# Patient Record
Sex: FEMALE | Race: WHITE | Employment: UNEMPLOYED | URBAN - METROPOLITAN AREA
[De-identification: names, ages, dates, MRNs, and addresses within clinical notes are randomized per-mention and may not be internally consistent; named-entity substitution may affect disease eponyms.]

---

## 2017-02-20 ENCOUNTER — NURSING HOME VISIT (OUTPATIENT)
Dept: GERIATRICS | Facility: CLINIC | Age: 82
End: 2017-02-20
Payer: MEDICARE

## 2017-02-20 VITALS
RESPIRATION RATE: 18 BRPM | BODY MASS INDEX: 32.76 KG/M2 | DIASTOLIC BLOOD PRESSURE: 67 MMHG | WEIGHT: 166.9 LBS | OXYGEN SATURATION: 97 % | SYSTOLIC BLOOD PRESSURE: 111 MMHG | HEART RATE: 84 BPM | HEIGHT: 60 IN | TEMPERATURE: 98.3 F

## 2017-02-20 DIAGNOSIS — M15.0 PRIMARY OSTEOARTHRITIS INVOLVING MULTIPLE JOINTS: ICD-10-CM

## 2017-02-20 DIAGNOSIS — E03.4 HYPOTHYROIDISM DUE TO ACQUIRED ATROPHY OF THYROID: ICD-10-CM

## 2017-02-20 DIAGNOSIS — F02.80 LATE ONSET ALZHEIMER'S DISEASE WITHOUT BEHAVIORAL DISTURBANCE (H): Primary | ICD-10-CM

## 2017-02-20 DIAGNOSIS — G30.1 LATE ONSET ALZHEIMER'S DISEASE WITHOUT BEHAVIORAL DISTURBANCE (H): Primary | ICD-10-CM

## 2017-02-20 PROCEDURE — 99207 ZZC CDG-CORRECTLY CODED, REVIEWED AND AGREE: CPT | Performed by: FAMILY MEDICINE

## 2017-02-20 PROCEDURE — 99308 SBSQ NF CARE LOW MDM 20: CPT | Performed by: FAMILY MEDICINE

## 2017-02-20 NOTE — PROGRESS NOTES
[facility]    Chief Complaint   Patient presents with     jail Regulatory       HPI:    Alondra Thompson is a 89 year old  (2/27/1919), who is being seen today for a federally mandated E/M visit at [facility]. Today's concerns are:  1. Late onset Alzheimer's disease without behavioral disturbance - stable at present, redirectable, no issues today   2. Primary osteoarthritis involving multiple joints - mild complaint pain, knees mostly- ok with current tx   3. Hypothyroidism due to acquired atrophy of thyroid - on synthroid - monitor with TSH q 6 months       ALLERGIES: Macrodantin [nitrofurantoin]  PAST MEDICAL HISTORY:  has a past medical history of Arthritis; Dementia; and Hypothyroidism.  PAST SURGICAL HISTORY:  has a past surgical history that includes orthopedic surgery; hernia repair; and Abdomen surgery.  FAMILY HISTORY: family history is not on file.  SOCIAL HISTORY:  reports that she has never smoked. She does not have any smokeless tobacco history on file. She reports that she drinks alcohol.    MEDICATIONS:  Current Outpatient Prescriptions   Medication Sig Dispense Refill     levothyroxine (SYNTHROID) 112 MCG tablet Take 1 tablet (112 mcg) by mouth daily 90 tablet 11     ammonium lactate (AMLACTIN) 12 % cream Apply 1 g topically 2 times daily 385 g 11     nystatin (MYCOSTATIN) 802541 UNIT/GM POWD Apply 1 g topically 2 times daily as needed 60 g 11     acetaminophen (TYLENOL) 500 MG tablet Take 2 tablets (1,000 mg) by mouth 2 times daily 90 tablet 11     polyethylene glycol (MIRALAX/GLYCOLAX) Packet Take 17 g by mouth 2 times daily as needed for constipation 24 packet 11     multivitamin, therapeutic with minerals (THERA-VIT-M) TABS tablet Take 1 tablet by mouth daily 30 each 11     Medications reviewed:  Medications reconciled to facility chart and changes were made to reflect current medications as identified as above med list. Below are the  changes that were made:   Medications stopped since last EPIC medication reconciliation:   There are no discontinued medications.    Medications started since last [facility] medication reconciliation:  No orders of the defined types were placed in this encounter.      Case Management:  I have reviewed the care plan and MDS and do agree with the plan. Patient's desire to return to the community is not present.  Information reviewed:  Medications, vital signs, orders, and nursing notes.    ROS:  10 point ROS of systems including Constitutional, Eyes, Respiratory, Cardiovascular, Gastroenterology, Genitourinary, Integumentary, Muscularskeletal, Psychiatric were all negative except for pertinent positives noted in my HPI.    Exam:  /67  Pulse 84  Temp 98.3  F (36.8  C)  Resp 18  Ht 5' (1.524 m)  Wt 166 lb 14.4 oz (75.7 kg)  SpO2 97%  BMI 32.6 kg/m2  GENERAL APPEARANCE:  Alert, in no distress, thin, cooperative  RESP:  respiratory effort and palpation of chest normal, lungs clear to auscultation , no respiratory distress  CV:  Palpation and auscultation of heart done , regular rate and rhythm, no murmur, rub, or gallop, no edema  PSYCH:  insight and judgement impaired, memory impaired     Lab/Diagnostic data:    No Current Labs on file.    ASSESSMENT/PLAN  1. Late onset Alzheimer's disease without behavioral disturbance - stable at present, redirectable, no issues today- cont 24/7 NH care   2. Primary osteoarthritis involving multiple joints - mild complaint pain, knees mostly- ok with current tx   3. Hypothyroidism due to acquired atrophy of thyroid - on synthroid - monitor with TSH q 6 months           Total time spent with patient visit was 15 min including patient visit and review of past records.Greater than 50% of total time spent with counseling and coordinating care.    Electronically signed by:  Jorge Luis Moser MD

## 2017-04-03 ENCOUNTER — NURSING HOME VISIT (OUTPATIENT)
Dept: GERIATRICS | Facility: CLINIC | Age: 82
End: 2017-04-03
Payer: MEDICARE

## 2017-04-03 VITALS
HEART RATE: 77 BPM | OXYGEN SATURATION: 97 % | DIASTOLIC BLOOD PRESSURE: 77 MMHG | SYSTOLIC BLOOD PRESSURE: 134 MMHG | WEIGHT: 165.2 LBS | RESPIRATION RATE: 18 BRPM | HEIGHT: 60 IN | BODY MASS INDEX: 32.43 KG/M2 | TEMPERATURE: 97.7 F

## 2017-04-03 DIAGNOSIS — F02.80 LATE ONSET ALZHEIMER'S DISEASE WITHOUT BEHAVIORAL DISTURBANCE (H): Primary | ICD-10-CM

## 2017-04-03 DIAGNOSIS — K59.00 CONSTIPATION, UNSPECIFIED CONSTIPATION TYPE: ICD-10-CM

## 2017-04-03 DIAGNOSIS — I10 BENIGN ESSENTIAL HYPERTENSION: ICD-10-CM

## 2017-04-03 DIAGNOSIS — B37.2 CANDIDIASIS OF SKIN: ICD-10-CM

## 2017-04-03 DIAGNOSIS — M15.0 PRIMARY OSTEOARTHRITIS INVOLVING MULTIPLE JOINTS: ICD-10-CM

## 2017-04-03 DIAGNOSIS — E03.9 HYPOTHYROIDISM, UNSPECIFIED TYPE: ICD-10-CM

## 2017-04-03 DIAGNOSIS — G30.1 LATE ONSET ALZHEIMER'S DISEASE WITHOUT BEHAVIORAL DISTURBANCE (H): Primary | ICD-10-CM

## 2017-04-03 PROCEDURE — 99318 ZZC ANNUAL NURSING FAC ASSESSMNT, STABLE: CPT | Performed by: NURSE PRACTITIONER

## 2017-04-03 PROCEDURE — 99207 ZZC CDG-CORRECTLY CODED, REVIEWED AND AGREE: CPT | Performed by: NURSE PRACTITIONER

## 2017-04-03 NOTE — PROGRESS NOTES
[facility]  Chief Complaint   Patient presents with     Annual Comprehensive Nursing Home       HPI:    Alondra Thompson is a 89 year old  (2/27/1919), who is being seen today for an annual comprehensive visit at [facility]. Today's concerns are:  Late onset Alzheimer's disease without behavioral disturbance  Oriented to self. Is very pleasant. Staff reports occasional behaviors, more so with cares, but she is usually cooperative and redirectable. She is very conversational, but is very repetitive. She has been eating and sleeping well. Weights slightly down, but fairly stable.    Primary osteoarthritis involving multiple joints  Has history of arthritis, with chronic knee pain. She is on scheduled tylenol, with good pain control. She denies pain on exam today. She is primarily WC bound. No recent falls noted.    Hypothyroidism, unspecified type  Chronic, currently on synthroid daily. Does was adjusted in 10/2016. Recheck on TSH on 11/17/17 was normal at 3.71    Candidiasis of skin  Has history of recurrent candidiasis on skin under breasts. None noted on exam today. She does have PRN nystatin available, which has been effective.     Constipation, unspecified constipation type  No issues noted recently She is on PRN miralax and has been having regular movements. Patient denies any issues with constipation currently.    Benign essential hypertension  Historical. Not currently on any medications. Denies any headaches, lightheadedness, chest pain, or SOB. Goal of care is comfort.   Last 3 BPs:134/77, 153/94, 132/72.  Current Weight: 165.2 lbs      3/29/19 PHQ9: 1/30    ALLERGIES: Macrodantin [nitrofurantoin]  PROBLEM LIST:  Patient Active Problem List   Diagnosis     Knee pain     Dementia     Osteoarthritis     Risk for falls     Hypothyroidism     Advance Care Planning     Annual physical exam 4/9/15     Late onset Alzheimer's disease without behavioral disturbance      PAST MEDICAL HISTORY:  has a past medical history of Arthritis; Dementia; and Hypothyroidism.  PAST SURGICAL HISTORY:  has a past surgical history that includes orthopedic surgery; hernia repair; and Abdomen surgery.  FAMILY HISTORY: family history is not on file.  SOCIAL HISTORY:  reports that she has never smoked. She does not have any smokeless tobacco history on file. She reports that she drinks alcohol.  IMMUNIZATIONS:  Most Recent Immunizations   Administered Date(s) Administered     Influenza (High Dose) 3 valent vaccine 10/17/2016     Influenza (IIV3) 10/08/2015     Pneumococcal (PCV 13) 10/14/2015     Pneumococcal 23 valent 04/08/2014     TD (ADULT, 7+) 03/29/2011     Above immunizations pulled from [facility]. [facility] and facility records also reconciled. Outstanding information sent to  to update [facility]. Future immunizations are not needed at this point as all recommended immunizations are up to date.   MEDICATIONS:  Current Outpatient Prescriptions   Medication Sig Dispense Refill     levothyroxine (SYNTHROID) 112 MCG tablet Take 1 tablet (112 mcg) by mouth daily 90 tablet 11     ammonium lactate (AMLACTIN) 12 % cream Apply 1 g topically 2 times daily 385 g 11     nystatin (MYCOSTATIN) 030346 UNIT/GM POWD Apply 1 g topically 2 times daily as needed 60 g 11     acetaminophen (TYLENOL) 500 MG tablet Take 2 tablets (1,000 mg) by mouth 2 times daily 90 tablet 11     polyethylene glycol (MIRALAX/GLYCOLAX) Packet Take 17 g by mouth 2 times daily as needed for constipation 24 packet 11     multivitamin, therapeutic with minerals (THERA-VIT-M) TABS tablet Take 1 tablet by mouth daily 30 each 11     Medications reviewed:  Medications reconciled to facility chart and changes were made to reflect current medications as identified as above med list. Below are the changes that were made:   Medications stopped since last EPIC medication reconciliation:   There are no discontinued  medications.    Medications started since last [facility] medication reconciliation:  No orders of the defined types were placed in this encounter.        Case Management:  I have reviewed the facility/SNF care plan/MDS which was done 3/27/17, including the falls risk, nutrition and pain screening. I also reviewed the current immunizations, and preventive care..Future cancer screening is not clinically indicated secondary to age/goals of care Patient's desire to return to the community is not assessible due to cognitive impairment. Current Level of Care is appropriate.    Advance Directive Discussion:    I reviewed the current advanced directives as reflected in [facility] and the facility chart. I contacted the first party [employer] and left a message regarding the plan of Care. I reviewed the POLST, resigned, dated and sent to [employer].  I did not due to cognitive impairment review the advance directives with the resident.     Team Discussion:  I communicated with the appropriate disciplines involved with the Plan of Care:   Nursing    Patient Goal:  Patient's goal is unobtainable secondary to cognitive impairment.    Information reviewed:  Medications, vital signs, orders, and nursing notes.    ROS:  10 point ROS of systems including Constitutional, Eyes, Respiratory, Cardiovascular, Gastroenterology, Genitourinary, Integumentary, Muscularskeletal, Psychiatric were all negative except for pertinent positives noted in my HPI.    Exam:  /77  Pulse 77  Temp 97.7  F (36.5  C)  Resp 18  Ht 5' (1.524 m)  Wt 165 lb 3.2 oz (74.9 kg)  SpO2 97%  BMI 32.26 kg/m2    GENERAL APPEARANCE:  Alert, in no distress, cooperative  ENT:  Mouth and posterior oropharynx normal, moist mucous membranes, [place], poor dentition, missing upper dentures  EYES:  EOM, conjunctivae, lids, pupils and irises normal, PERRL  NECK:  No adenopathy,masses or thyromegaly  RESP:  respiratory effort and palpation of chest normal, lungs clear to  auscultation , no respiratory distress  CV:  Palpation and auscultation of heart done , regular rate and rhythm, no murmur, rub, or gallop, +2 pedal pulses, peripheral edema trace+ in bilat LE  ABDOMEN:  normal bowel sounds, soft, nontender, no hepatosplenomegaly or other masses, no guarding or rebound  M/S:   Gait and station abnormal primarily WC bound, no joint tenderness, full ROM BUE, LE strength 4/5, BUE 4/5  SKIN:  Inspection of skin and subcutaneous tissue baseline, Palpation of skin and subcutaneous tissue baseline, no rashes, bruises, or wounds noted  NEURO:   Cranial nerves 2-12 are normal tested and grossly at patient's baseline, Examination of sensation by touch normal  PSYCH:  oriented to self, insight and judgement impaired, memory impaired , affect and mood normal, very pleasant, smiling, laughing and joking with provider     Lab/Diagnostic data:      No recent labs on file      ASSESSMENT/PLAN  (G30.1,  F02.80) Late onset Alzheimer's disease without behavioral disturbance  (primary encounter diagnosis)  Comment: Progressive. Expect further cognitive and functional declines. Expect weight loss  Plan: Appropriate for 24 hour nursing care, continue NH cares    (M15.0) Primary osteoarthritis involving multiple joints  Comment: Controlled  Plan: Continue scheduled tylenol    (E03.9) Hypothyroidism, unspecified type  Comment: Chronic, stable  Plan: Continue synthroid at current dose, monitor TSH q 6 months    (B37.2) Candidiasis of skin  Comment: Recurrent, not currently present  Plan: Continue to monitor, PRN nystatin    (K59.00) Constipation, unspecified constipation type  Comment: Controlled  Plan: Continue PRN miralax    (I10) Benign essential hypertension  Comment: Historical, controlled. To avoid risk of hypotension, falls, dizziness and tissue hypoperfusion, recommend BP goal is < 150/80mmHg.   Plan: Monitor VS, BMP PRN.     Depression screen done: PHQ-9 Given screen score and clinical assessment  patient is stable without any signs of depression and no futher interventions warrented at this time.    Based on JNC-8 goals,  patients age of 89 year old, no presence of diabetes or CKD, and goals of care goal BP is <150/90 mm Hg. patient is stable and continue without pharmacological invention with routine assessment.      Electronically signed by:  JOSE Peterson CNP

## 2017-06-14 VITALS
RESPIRATION RATE: 18 BRPM | TEMPERATURE: 97.1 F | BODY MASS INDEX: 31.31 KG/M2 | DIASTOLIC BLOOD PRESSURE: 68 MMHG | SYSTOLIC BLOOD PRESSURE: 130 MMHG | HEART RATE: 94 BPM | HEIGHT: 60 IN | OXYGEN SATURATION: 97 % | WEIGHT: 159.5 LBS

## 2017-06-15 ENCOUNTER — NURSING HOME VISIT (OUTPATIENT)
Dept: GERIATRICS | Facility: CLINIC | Age: 82
End: 2017-06-15
Payer: MEDICARE

## 2017-06-15 DIAGNOSIS — G89.29 CHRONIC PAIN OF BOTH KNEES: ICD-10-CM

## 2017-06-15 DIAGNOSIS — M25.562 CHRONIC PAIN OF BOTH KNEES: ICD-10-CM

## 2017-06-15 DIAGNOSIS — M15.0 PRIMARY OSTEOARTHRITIS INVOLVING MULTIPLE JOINTS: Primary | ICD-10-CM

## 2017-06-15 DIAGNOSIS — M25.561 CHRONIC PAIN OF BOTH KNEES: ICD-10-CM

## 2017-06-15 DIAGNOSIS — G30.1 LATE ONSET ALZHEIMER'S DISEASE WITHOUT BEHAVIORAL DISTURBANCE (H): ICD-10-CM

## 2017-06-15 DIAGNOSIS — F02.80 LATE ONSET ALZHEIMER'S DISEASE WITHOUT BEHAVIORAL DISTURBANCE (H): ICD-10-CM

## 2017-06-15 DIAGNOSIS — E03.9 ACQUIRED HYPOTHYROIDISM: ICD-10-CM

## 2017-06-15 PROCEDURE — 99207 ZZC CDG-CORRECTLY CODED, REVIEWED AND AGREE: CPT | Performed by: INTERNAL MEDICINE

## 2017-06-15 PROCEDURE — 99309 SBSQ NF CARE MODERATE MDM 30: CPT | Performed by: INTERNAL MEDICINE

## 2017-06-15 NOTE — PROGRESS NOTES
"Alondra Thompson is a 89 year old female seen More 15, 2017 at [facility] where she has resided for 3 years (admit 3/2014) seen to follow up dementia, OA.   In LTC because of dementia, no longer able to manage in the community.     Patient is seen on the unit, up to WC.   Pleasant and laughing.   States \"If I was any better I'd have to be seen by someone\"   Repetitive, but content and affect are good.      Patient has OA / DJD with knee pain as primary symptoms   Only time she is irritable is when she transfers to BR.   No pain when sitting in her WC, which she uses for all destinations.     Has h/o hypothyroidism and is on replacement       Past Medical History:   Diagnosis Date     Arthritis      Dementia      Hypothyroidism      SH:    Single, previously lived with her sister, house in [place].   Retired from work for the [employer]        ROS:  Limited, but negative other than above.   Weight is stable       EXAM:  NAD  /68  Pulse 94  Temp 97.1  F (36.2  C)  Resp 18  Ht 5' (1.524 m)  Wt 159 lb 8 oz (72.3 kg)  SpO2 97%  BMI 31.15 kg/m2   Neck supple without adenopathy  Lung clear bilaterally with fair air movement  Heart RRR s1s2, 1/6 JULI  Abd obese, soft, NT, no distention, +BS  Ext 1-2+ LE edema to hips.     Deforming changes of OA in both hands.     Psych: affect okay  Neuro: no focal deficits, confused.     TSH   Date Value Ref Range Status   10/04/2016 7.31 (A) 5 mcU/mL Final   11/17/16    3.71    IMP/PLAN:  (M15.0) Primary osteoarthritis involving multiple joints    Comment: with deformities, some pain and decreased mobility     Plan: scheduled and prn acetaminophen, local measures.       (M25.561,  M25.562,  G89.29) Chronic pain of both knees  Comment: DJD     Plan: pain regimen as above, assist for transfers, and WC mobility.       (E03.9) Acquired hypothyroidism  Comment: on replacement  Plan: yearly TSH, goal 4-6       (G30.1,  F02.80) Late onset Alzheimer's " disease without behavioral disturbance  Comment: gradual decline, low functional status   Plan: LTC support for assist with meds, meals, activity, mobility and safety.        Adrianne Vila MD

## 2017-07-13 ENCOUNTER — TRANSFERRED RECORDS (OUTPATIENT)
Dept: HEALTH INFORMATION MANAGEMENT | Facility: CLINIC | Age: 82
End: 2017-07-13

## 2017-07-13 LAB — TSH SERPL-ACNC: 4.74 UIU/ML (ref 0.3–5)

## 2017-08-09 VITALS
HEART RATE: 86 BPM | TEMPERATURE: 98.4 F | SYSTOLIC BLOOD PRESSURE: 115 MMHG | BODY MASS INDEX: 31.47 KG/M2 | HEIGHT: 60 IN | OXYGEN SATURATION: 97 % | DIASTOLIC BLOOD PRESSURE: 63 MMHG | RESPIRATION RATE: 18 BRPM | WEIGHT: 160.3 LBS

## 2017-08-09 NOTE — PROGRESS NOTES
[facility]    Chief Complaint   Patient presents with     shelter Regulatory       HPI:    Alondra Thompson is a 89 year old  (2/27/1919), who is being seen today for a federally mandated E/M visit at [facility].  HPI information obtained from: facility chart records, facility staff and patient report. Today's concerns are:    Primary osteoarthritis involving multiple joints  Chronic, primarily to knees.  On scheduled APAP with good effect.  W/C bound, no recent falls     Acquired hypothyroidism  Chronic, on synthroid.   Lab Results   Component Value Date    TSH 4.74 07/13/2017         Late onset Alzheimer's disease without behavioral disturbance  Chronic and progressive.  Occasional behaviors with cares, generally redirectable.  Eating and sleeping well per staff, weight stable.  Wt Readings from Last 3 Encounters:   08/09/17 160 lb 4.8 oz (72.7 kg)   06/14/17 159 lb 8 oz (72.3 kg)   04/03/17 165 lb 3.2 oz (74.9 kg)       Constipation, unspecified constipation type  Per history, has prn miralex.  Per records having regular BM.  Denies/no reports of constipation or diarrhea, abdominal pain     Benign essential hypertension  Per history, goal of care comfort, not on antihypertensive.  Denies/no reports of chest pain, palpitations, headache.  Last 2 BPs:115/63, 119/61         Current Weight: 160.3 lbs      ALLERGIES: Macrodantin [nitrofurantoin]  PAST MEDICAL HISTORY:  has a past medical history of Arthritis; Dementia; and Hypothyroidism.  PAST SURGICAL HISTORY:  has a past surgical history that includes orthopedic surgery; hernia repair; and Abdomen surgery.  FAMILY HISTORY: family history is not on file.  SOCIAL HISTORY:  reports that she has never smoked. She does not have any smokeless tobacco history on file. She reports that she drinks alcohol.    MEDICATIONS:  Current Outpatient Prescriptions   Medication Sig Dispense Refill     levothyroxine  (SYNTHROID) 112 MCG tablet Take 1 tablet (112 mcg) by mouth daily 90 tablet 11     ammonium lactate (AMLACTIN) 12 % cream Apply 1 g topically 2 times daily 385 g 11     nystatin (MYCOSTATIN) 063310 UNIT/GM POWD Apply 1 g topically 2 times daily as needed 60 g 11     acetaminophen (TYLENOL) 500 MG tablet Take 2 tablets (1,000 mg) by mouth 2 times daily 90 tablet 11     polyethylene glycol (MIRALAX/GLYCOLAX) Packet Take 17 g by mouth 2 times daily as needed for constipation 24 packet 11     multivitamin, therapeutic with minerals (THERA-VIT-M) TABS tablet Take 1 tablet by mouth daily 30 each 11     Medications reviewed:  Medications reconciled to facility chart and changes were made to reflect current medications as identified as above med list. Below are the changes that were made:   Medications stopped since last EPIC medication reconciliation:   There are no discontinued medications.    Medications started since last [facility] medication reconciliation:  No orders of the defined types were placed in this encounter.        Case Management:  I have reviewed the care plan and MDS and do agree with the plan. Patient's desire to return to the community is not present.  Information reviewed:  Medications, vital signs, orders, and nursing notes.    ROS:  Unobtainable secondary to cognitive impairment or aphasia, but today pt reports doing well     Exam:  Vitals: /63  Pulse 86  Temp 98.4  F (36.9  C)  Resp 18  Ht 5' (1.524 m)  Wt 160 lb 4.8 oz (72.7 kg)  SpO2 97%  BMI 31.31 kg/m2  BMI= Body mass index is 31.31 kg/(m^2).  GENERAL APPEARANCE:  Alert, in no distress, sitting up in w/c in sunroom  ENT:  Mouth and posterior oropharynx normal, moist mucous membranes, poor dentition  RESP:  respiratory effort and palpation of chest normal, lungs clear to auscultation , no respiratory distress  CV:  Palpation and auscultation of heart done , regular rate and rhythm, no murmur, rub, or gallop, peripheral edema 2+ in BLLE,  extremities cool to touch---baseline  ABDOMEN:  normal bowel sounds, soft, nontender, no hepatosplenomegaly or other masses  M/S:   Gait and station abnormal primarily w/c bound, CMS intact, no erythema/edema of joints   NEURO:   Cranial nerves 2-12 are normal tested and grossly at patient's baseline, speech clear, no tremor   PSYCH:  oriented to person, place, insight and judgement impaired, memory impaired        Lab/Diagnostic data:      TSH   Date Value Ref Range Status   07/13/2017 4.74 0.30 - 5.00 uIU/mL Final   10/04/2016 7.31 (A) 5 mcU/mL Final       ASSESSMENT/PLAN  (M15.0) Primary osteoarthritis involving multiple joints  (primary encounter diagnosis)  Comment: Chronic, stable  Plan: continue APAP    (E03.9) Acquired hypothyroidism  Comment: Chronic, stable  Plan: Continue Synthroid, monitor TSH Q 6 months     (G30.1,  F02.80) Late onset Alzheimer's disease without behavioral disturbance  Comment: Chronic and progressive  Plan: Continue with care and support in NH     (K59.00) Constipation, unspecified constipation type  Comment: Chronic, stable  Plan: prn miralex    (I10) Benign essential hypertension  Comment: Per history, not currently on meds  Plan: Monitor BP--goal of care comfort, goal SBP < 150            Electronically signed by:  JOSE Ennis CNP

## 2017-08-10 ENCOUNTER — NURSING HOME VISIT (OUTPATIENT)
Dept: GERIATRICS | Facility: CLINIC | Age: 82
End: 2017-08-10
Payer: MEDICARE

## 2017-08-10 DIAGNOSIS — I10 BENIGN ESSENTIAL HYPERTENSION: ICD-10-CM

## 2017-08-10 DIAGNOSIS — G30.1 LATE ONSET ALZHEIMER'S DISEASE WITHOUT BEHAVIORAL DISTURBANCE (H): ICD-10-CM

## 2017-08-10 DIAGNOSIS — E03.9 ACQUIRED HYPOTHYROIDISM: ICD-10-CM

## 2017-08-10 DIAGNOSIS — K59.00 CONSTIPATION, UNSPECIFIED CONSTIPATION TYPE: ICD-10-CM

## 2017-08-10 DIAGNOSIS — F02.80 LATE ONSET ALZHEIMER'S DISEASE WITHOUT BEHAVIORAL DISTURBANCE (H): ICD-10-CM

## 2017-08-10 DIAGNOSIS — M15.0 PRIMARY OSTEOARTHRITIS INVOLVING MULTIPLE JOINTS: Primary | ICD-10-CM

## 2017-08-10 PROCEDURE — 99309 SBSQ NF CARE MODERATE MDM 30: CPT | Performed by: NURSE PRACTITIONER

## 2017-10-11 VITALS
SYSTOLIC BLOOD PRESSURE: 104 MMHG | BODY MASS INDEX: 31.47 KG/M2 | WEIGHT: 160.3 LBS | HEART RATE: 86 BPM | DIASTOLIC BLOOD PRESSURE: 57 MMHG | HEIGHT: 60 IN | RESPIRATION RATE: 20 BRPM | TEMPERATURE: 97.4 F | OXYGEN SATURATION: 97 %

## 2017-10-12 ENCOUNTER — NURSING HOME VISIT (OUTPATIENT)
Dept: GERIATRICS | Facility: CLINIC | Age: 82
End: 2017-10-12
Payer: MEDICARE

## 2017-10-12 DIAGNOSIS — G30.1 LATE ONSET ALZHEIMER'S DISEASE WITHOUT BEHAVIORAL DISTURBANCE (H): ICD-10-CM

## 2017-10-12 DIAGNOSIS — M25.562 CHRONIC PAIN OF BOTH KNEES: ICD-10-CM

## 2017-10-12 DIAGNOSIS — M15.0 PRIMARY OSTEOARTHRITIS INVOLVING MULTIPLE JOINTS: Primary | ICD-10-CM

## 2017-10-12 DIAGNOSIS — F02.80 LATE ONSET ALZHEIMER'S DISEASE WITHOUT BEHAVIORAL DISTURBANCE (H): ICD-10-CM

## 2017-10-12 DIAGNOSIS — M25.561 CHRONIC PAIN OF BOTH KNEES: ICD-10-CM

## 2017-10-12 DIAGNOSIS — G89.29 CHRONIC PAIN OF BOTH KNEES: ICD-10-CM

## 2017-10-12 PROCEDURE — 99308 SBSQ NF CARE LOW MDM 20: CPT | Performed by: INTERNAL MEDICINE

## 2017-10-12 PROCEDURE — 99207 ZZC CDG-CORRECTLY CODED, REVIEWED AND AGREE: CPT | Performed by: INTERNAL MEDICINE

## 2017-12-14 ENCOUNTER — NURSING HOME VISIT (OUTPATIENT)
Dept: GERIATRICS | Facility: CLINIC | Age: 82
End: 2017-12-14
Payer: MEDICARE

## 2017-12-14 VITALS
HEIGHT: 60 IN | WEIGHT: 160.3 LBS | RESPIRATION RATE: 13 BRPM | TEMPERATURE: 97.8 F | BODY MASS INDEX: 31.47 KG/M2 | SYSTOLIC BLOOD PRESSURE: 132 MMHG | DIASTOLIC BLOOD PRESSURE: 69 MMHG | OXYGEN SATURATION: 97 % | HEART RATE: 65 BPM

## 2017-12-14 DIAGNOSIS — G30.9 ALZHEIMER'S DEMENTIA WITH BEHAVIORAL DISTURBANCE, UNSPECIFIED TIMING OF DEMENTIA ONSET: ICD-10-CM

## 2017-12-14 DIAGNOSIS — F02.81 ALZHEIMER'S DEMENTIA WITH BEHAVIORAL DISTURBANCE, UNSPECIFIED TIMING OF DEMENTIA ONSET: ICD-10-CM

## 2017-12-14 DIAGNOSIS — I10 BENIGN ESSENTIAL HYPERTENSION: ICD-10-CM

## 2017-12-14 DIAGNOSIS — K59.00 CONSTIPATION, UNSPECIFIED CONSTIPATION TYPE: ICD-10-CM

## 2017-12-14 DIAGNOSIS — M15.0 PRIMARY OSTEOARTHRITIS INVOLVING MULTIPLE JOINTS: Primary | ICD-10-CM

## 2017-12-14 DIAGNOSIS — E03.9 ACQUIRED HYPOTHYROIDISM: ICD-10-CM

## 2017-12-14 PROCEDURE — 99309 SBSQ NF CARE MODERATE MDM 30: CPT | Performed by: NURSE PRACTITIONER

## 2017-12-14 NOTE — PROGRESS NOTES
[place] [facility]    Chief Complaint   Patient presents with     [facility] Regulatory       HPI:    Alondra Thompson is a 89 year old  (2/27/1919), who is being seen today for a federally mandated E/M visit at [facility].  HPI information obtained from: facility chart records, facility staff, patient report and [facility] chart review. Today's concerns are:    Primary osteoarthritis involving multiple joints  Per history to multiple joints.  Denies/no reports of pain.  No longer ambulatory.  Last fall noted in October with no injuries    Late onset Alzheimer's disease without behavioral disturbance  Chronic and progressive.  Behaviors with cares, per NH notes can refuse to take bathes, does not like assistance in bathroom.  Her behaviors with staff often include yelling and cursing and occasional combativeness.  Eating and sleeping well per staff, weight stable.  Wt Readings from Last 3 Encounters:   12/14/17 160 lb 4.8 oz (72.7 kg)   10/11/17 160 lb 4.8 oz (72.7 kg)   08/09/17 160 lb 4.8 oz (72.7 kg)       Acquired hypothyroidism  Chronic, on synthroid.   Lab Results   Component Value Date    TSH 4.74 07/13/2017       Constipation, unspecified constipation type  Per history, has prn miralex.  Per records having regular BM.  Denies/no reports of constipation or diarrhea, abdominal pain     Benign essential hypertension  Per history, goal of care comfort, not on antihypertensive.  Denies/no reports of chest pain, palpitations, headache.    Last 3 BPs: 132/69, 149/76, 110/62  Lab Results   Component Value Date    POTASSIUM 4.2 04/07/2016     Lab Results   Component Value Date    CR 0.73 04/07/2016            Current Weight: 160.3 lbs    ALLERGIES: Macrodantin [nitrofurantoin]  PAST MEDICAL HISTORY:  has a past medical history of Arthritis; Dementia; and Hypothyroidism.  PAST SURGICAL HISTORY:  has a past surgical history that includes orthopedic surgery; hernia  "repair; and Abdomen surgery.  FAMILY HISTORY: family history is not on file.  SOCIAL HISTORY:  reports that she has never smoked. She does not have any smokeless tobacco history on file. She reports that she drinks alcohol.    MEDICATIONS:  Current Outpatient Prescriptions   Medication Sig Dispense Refill     levothyroxine (SYNTHROID) 112 MCG tablet Take 1 tablet (112 mcg) by mouth daily 90 tablet 11     ammonium lactate (AMLACTIN) 12 % cream Apply 1 g topically 2 times daily 385 g 11     nystatin (MYCOSTATIN) 002802 UNIT/GM POWD Apply 1 g topically 2 times daily as needed 60 g 11     acetaminophen (TYLENOL) 500 MG tablet Take 2 tablets (1,000 mg) by mouth 2 times daily 90 tablet 11     polyethylene glycol (MIRALAX/GLYCOLAX) Packet Take 17 g by mouth 2 times daily as needed for constipation 24 packet 11     multivitamin, therapeutic with minerals (THERA-VIT-M) TABS tablet Take 1 tablet by mouth daily 30 each 11     Medications reviewed:  Medications reconciled to facility chart and changes were made to reflect current medications as identified as above med list. Below are the changes that were made:   Medications stopped since last EPIC medication reconciliation:   There are no discontinued medications.    Medications started since last [facility] medication reconciliation:  No orders of the defined types were placed in this encounter.      Case Management:  I have reviewed the care plan and MDS and do agree with the plan. Patient's desire to return to the community is not assessible due to cognitive impairment.  Information reviewed:  Medications, vital signs, orders, and nursing notes.    ROS:  Unobtainable secondary to cognitive impairment or aphasia, but today pt reports \"pushing the heck out of 100\"    Exam:  Vitals: /69  Pulse 65  Temp 97.8  F (36.6  C)  Resp 13  Ht 5' (1.524 m)  Wt 160 lb 4.8 oz (72.7 kg)  SpO2 97%  BMI 31.31 kg/m2  BMI= Body mass index is 31.31 kg/(m^2).  GENERAL APPEARANCE:  Alert, " in no distress, sitting up in w/c in sunroom  ENT:  Mouth and posterior oropharynx normal, moist mucous membranes, poor dentition  RESP:  respiratory effort and palpation of chest normal, lungs clear to auscultation , no respiratory distress  CV:  Palpation and auscultation of heart done , regular rate and rhythm,  murmur, no rub, or gallop, peripheral edema 2+ in BLLE  ABDOMEN:  normal bowel sounds, soft, nontender, no hepatosplenomegaly or other masses  M/S:   Gait and station abnormal primarily w/c bound, CMS intact, no erythema/edema of joints, deforming changes of OA in both hands  NEURO:   Cranial nerves 2-12 are normal tested and grossly at patient's baseline, speech clear, no tremor   PSYCH:  oriented to person, place, insight and judgement impaired, memory impaired, pleasant during encounter     Lab/Diagnostic data:      TSH   Date Value Ref Range Status   07/13/2017 4.74 0.30 - 5.00 uIU/mL Final   10/04/2016 7.31 (A) 5 mcU/mL Final       ASSESSMENT/PLAN  (M15.0) Primary osteoarthritis involving multiple joints  (primary encounter diagnosis)  Comment: Chronic, deformities hands, decreased mobilities  Plan: change APAP to prn--staff update if having pain   -assist transfers and cares    (G30.1,  F02.80) Late onset Alzheimer's disease with behavioral disturbance  Comment: Chronic and progressive with behaviors during cares  Plan: appropriate for LTC, staff assist with cares, redirect behaviors, staff update if continue with difficulty with cares, may need to premedicate if continues to refuses bathes/sit in soiled clothing  -d'c MV, eating well, no weight loss    (E03.9) Acquired hypothyroidism  Comment: Chronic, stable  Plan: continue synthroid  -annual TSH    (K59.00) Constipation, unspecified constipation type  Comment: Chronic, no issues reported  Plan: prn bowel meds    (I10) Benign essential hypertension  Comment: chronic, WNL without meds at this time  Plan: monitor BP  -BMP next week            Electronically signed by:  JOSE Ennis CNP

## 2017-12-18 ENCOUNTER — TRANSFERRED RECORDS (OUTPATIENT)
Dept: HEALTH INFORMATION MANAGEMENT | Facility: CLINIC | Age: 82
End: 2017-12-18

## 2017-12-18 LAB
ANION GAP SERPL CALCULATED.3IONS-SCNC: 6 MMOL/L (ref 5–18)
BUN SERPL-MCNC: 16 MG/DL (ref 8–28)
CALCIUM SERPL-MCNC: 9 MG/DL (ref 8.5–10.5)
CHLORIDE SERPLBLD-SCNC: 110 MMOL/L (ref 98–107)
CO2 SERPL-SCNC: 26 MMOL/L (ref 22–31)
CREAT SERPL-MCNC: 0.73 MG/DL (ref 0.6–1.1)
GFR SERPL CREATININE-BSD FRML MDRD: >60 ML/MIN/1.73M2
GLUCOSE SERPL-MCNC: 74 MG/DL (ref 70–125)
POTASSIUM SERPL-SCNC: 4.4 MMOL/L (ref 3.5–5)
SODIUM SERPL-SCNC: 142 MMOL/L (ref 136–145)

## 2017-12-19 NOTE — PROGRESS NOTES
Order(s) created erroneously. Erroneous order ID: [id number]   Order canceled by: CHASTITY WAGNER   Order cancel date/time: 12/19/2017 10:27 AM

## 2018-01-01 ENCOUNTER — NURSING HOME VISIT (OUTPATIENT)
Dept: GERIATRICS | Facility: CLINIC | Age: 83
End: 2018-01-01
Payer: MEDICARE

## 2018-01-01 ENCOUNTER — CLINICAL UPDATE (OUTPATIENT)
Dept: PHARMACY | Facility: CLINIC | Age: 83
End: 2018-01-01

## 2018-01-01 ENCOUNTER — RECORDS - HEALTHEAST (OUTPATIENT)
Dept: LAB | Facility: CLINIC | Age: 83
End: 2018-01-01

## 2018-01-01 ENCOUNTER — TRANSFERRED RECORDS (OUTPATIENT)
Dept: HEALTH INFORMATION MANAGEMENT | Facility: CLINIC | Age: 83
End: 2018-01-01

## 2018-01-01 VITALS
OXYGEN SATURATION: 94 % | RESPIRATION RATE: 16 BRPM | SYSTOLIC BLOOD PRESSURE: 113 MMHG | HEIGHT: 60 IN | TEMPERATURE: 97.8 F | BODY MASS INDEX: 31.53 KG/M2 | DIASTOLIC BLOOD PRESSURE: 67 MMHG | HEART RATE: 78 BPM | WEIGHT: 160.6 LBS

## 2018-01-01 VITALS
SYSTOLIC BLOOD PRESSURE: 126 MMHG | BODY MASS INDEX: 32.31 KG/M2 | HEART RATE: 72 BPM | OXYGEN SATURATION: 95 % | WEIGHT: 164.6 LBS | DIASTOLIC BLOOD PRESSURE: 64 MMHG | TEMPERATURE: 97.4 F | HEIGHT: 60 IN | RESPIRATION RATE: 18 BRPM

## 2018-01-01 VITALS
SYSTOLIC BLOOD PRESSURE: 135 MMHG | DIASTOLIC BLOOD PRESSURE: 66 MMHG | HEART RATE: 78 BPM | TEMPERATURE: 96.9 F | RESPIRATION RATE: 16 BRPM | BODY MASS INDEX: 33.81 KG/M2 | HEIGHT: 60 IN | WEIGHT: 172.2 LBS | OXYGEN SATURATION: 95 %

## 2018-01-01 VITALS
DIASTOLIC BLOOD PRESSURE: 76 MMHG | RESPIRATION RATE: 18 BRPM | BODY MASS INDEX: 38.09 KG/M2 | WEIGHT: 164.6 LBS | HEART RATE: 65 BPM | TEMPERATURE: 98.1 F | SYSTOLIC BLOOD PRESSURE: 125 MMHG | HEIGHT: 55 IN | OXYGEN SATURATION: 95 %

## 2018-01-01 DIAGNOSIS — G30.1 LATE ONSET ALZHEIMER'S DISEASE WITH BEHAVIORAL DISTURBANCE (H): ICD-10-CM

## 2018-01-01 DIAGNOSIS — M15.0 PRIMARY OSTEOARTHRITIS INVOLVING MULTIPLE JOINTS: Primary | ICD-10-CM

## 2018-01-01 DIAGNOSIS — F02.818 LATE ONSET ALZHEIMER'S DISEASE WITH BEHAVIORAL DISTURBANCE (H): Primary | ICD-10-CM

## 2018-01-01 DIAGNOSIS — G30.1 LATE ONSET ALZHEIMER'S DISEASE WITH BEHAVIORAL DISTURBANCE (H): Primary | ICD-10-CM

## 2018-01-01 DIAGNOSIS — R60.0 LOCALIZED EDEMA: ICD-10-CM

## 2018-01-01 DIAGNOSIS — M15.0 PRIMARY OSTEOARTHRITIS INVOLVING MULTIPLE JOINTS: ICD-10-CM

## 2018-01-01 DIAGNOSIS — E03.9 ACQUIRED HYPOTHYROIDISM: ICD-10-CM

## 2018-01-01 DIAGNOSIS — K59.00 CONSTIPATION, UNSPECIFIED CONSTIPATION TYPE: ICD-10-CM

## 2018-01-01 DIAGNOSIS — I10 BENIGN ESSENTIAL HYPERTENSION: ICD-10-CM

## 2018-01-01 DIAGNOSIS — E03.9 HYPOTHYROIDISM, UNSPECIFIED TYPE: ICD-10-CM

## 2018-01-01 DIAGNOSIS — F02.818 LATE ONSET ALZHEIMER'S DISEASE WITH BEHAVIORAL DISTURBANCE (H): ICD-10-CM

## 2018-01-01 LAB
ANION GAP SERPL CALCULATED.3IONS-SCNC: 8 MMOL/L (ref 5–18)
ANION GAP SERPL CALCULATED.3IONS-SCNC: 8 MMOL/L (ref 5–18)
BUN SERPL-MCNC: 15 MG/DL (ref 8–28)
BUN SERPL-MCNC: 15 MG/DL (ref 8–28)
CALCIUM SERPL-MCNC: 8.8 MG/DL (ref 8.5–10.5)
CALCIUM SERPL-MCNC: 8.8 MG/DL (ref 8.5–10.5)
CHLORIDE BLD-SCNC: 109 MMOL/L (ref 98–107)
CHLORIDE SERPLBLD-SCNC: 109 MMOL/L (ref 98–107)
CO2 SERPL-SCNC: 24 MMOL/L (ref 22–31)
CO2 SERPL-SCNC: 24 MMOL/L (ref 22–31)
CREAT SERPL-MCNC: 0.69 MG/DL (ref 0.6–1.1)
CREAT SERPL-MCNC: 0.69 MG/DL (ref 0.6–1.1)
GFR SERPL CREATININE-BSD FRML MDRD: >60 ML/MIN/1.73M2
GFR SERPL CREATININE-BSD FRML MDRD: >60 ML/MIN/1.73M2
GLUCOSE BLD-MCNC: 80 MG/DL (ref 70–125)
GLUCOSE SERPL-MCNC: 80 MG/DL (ref 70–125)
POTASSIUM BLD-SCNC: 4.1 MMOL/L (ref 3.5–5)
POTASSIUM SERPL-SCNC: 4.1 MMOL/L (ref 3.5–5)
SODIUM SERPL-SCNC: 141 MMOL/L (ref 136–145)
SODIUM SERPL-SCNC: 141 MMOL/L (ref 136–145)
TSH SERPL DL<=0.005 MIU/L-ACNC: 2.87 UIU/ML (ref 0.3–5)
TSH SERPL-ACNC: 2.87 UIU/ML (ref 0.3–5)

## 2018-01-01 PROCEDURE — 99309 SBSQ NF CARE MODERATE MDM 30: CPT | Performed by: NURSE PRACTITIONER

## 2018-01-01 PROCEDURE — 99308 SBSQ NF CARE LOW MDM 20: CPT | Performed by: INTERNAL MEDICINE

## 2018-01-04 ENCOUNTER — NURSING HOME VISIT (OUTPATIENT)
Dept: GERIATRICS | Facility: CLINIC | Age: 83
End: 2018-01-04
Payer: MEDICARE

## 2018-01-04 VITALS
DIASTOLIC BLOOD PRESSURE: 76 MMHG | TEMPERATURE: 98.1 F | BODY MASS INDEX: 31.67 KG/M2 | RESPIRATION RATE: 18 BRPM | HEART RATE: 80 BPM | WEIGHT: 161.3 LBS | SYSTOLIC BLOOD PRESSURE: 122 MMHG | OXYGEN SATURATION: 97 % | HEIGHT: 60 IN

## 2018-01-04 DIAGNOSIS — G30.9 ALZHEIMER'S DEMENTIA WITH BEHAVIORAL DISTURBANCE, UNSPECIFIED TIMING OF DEMENTIA ONSET: Primary | ICD-10-CM

## 2018-01-04 DIAGNOSIS — H00.15 CHALAZION OF LEFT LOWER EYELID: ICD-10-CM

## 2018-01-04 DIAGNOSIS — M15.0 PRIMARY OSTEOARTHRITIS INVOLVING MULTIPLE JOINTS: ICD-10-CM

## 2018-01-04 DIAGNOSIS — F02.81 ALZHEIMER'S DEMENTIA WITH BEHAVIORAL DISTURBANCE, UNSPECIFIED TIMING OF DEMENTIA ONSET: Primary | ICD-10-CM

## 2018-01-04 PROCEDURE — 99309 SBSQ NF CARE MODERATE MDM 30: CPT | Performed by: NURSE PRACTITIONER

## 2018-01-04 NOTE — PROGRESS NOTES
"[place] [facility]    Chief Complaint   Patient presents with     Nursing Home Acute       HPI:    Alondra Thompson is a 89 year old  (2/27/1919), who is being seen today for an episodic care visit at [facility].    HPI information obtained from: facility chart records, facility staff, patient report and [facility] chart review. Today's concern is:    Alzheimer's dementia with behavioral disturbance, unspecified timing of dementia onset  Chronic and progressive.  Behaviors with cares, per NH notes can refuse to take bathes, does not like assistance in bathroom.  Her behaviors with staff often include yelling and cursing and occasional combativeness.  Eating and sleeping well per staff, weight stable.  Wt Readings from Last 3 Encounters:   01/04/18 161 lb 4.8 oz (73.2 kg)   12/14/17 160 lb 4.8 oz (72.7 kg)   10/11/17 160 lb 4.8 oz (72.7 kg)       Primary osteoarthritis involving multiple joints  Per history to multiple joints.  Denies/no reports of pain.  No longer ambulatory.      Chalazion of left lower eyelid  Seen today at request of nursing staff.  Patient with lesion to lower lid of left eyelid noted about 1 week ago.  Orders placed at that time for warm compresses, per nursing staff no improvement.  Patient denies/no reports of pain to eye, itching to eye, or changes in vision.  No erythema to sclera noted, mild erythema to conjunctiva of left eye, no drainage or purulence noted, PERRL, EOM intact    REVIEW OF SYSTEMS:  Unobtainable secondary to cognitive impairment or aphasia, but today pt reports feeling good, \"if I was any better, I don't know what I'd do\"    /76  Pulse 80  Temp 98.1  F (36.7  C)  Resp 18  Ht 5' (1.524 m)  Wt 161 lb 4.8 oz (73.2 kg)  SpO2 97%  BMI 31.5 kg/m2  GENERAL APPEARANCE:  Alert, in no distress  Eyes: PERRL, EOM intact, mild erythema to left conjunctiva but no purulent drainage, sclera white, nodular chalazion noted to lower lid of " left eye  Respiratory: non-labored, on room air, no cough, lungs clear  MS: non-ambulatory, chronic OA changes noted to joints particularly hands  Neuro: CN 2-12 intact, no tremor, no muscle wasting  Psych: cooperative, orientated to person, insight, judgement and memory impaired     ASSESSMENT/PLAN:  (G30.8,  F02.81) Alzheimer's dementia with behavioral disturbance, unspecified timing of dementia onset  (primary encounter diagnosis)  Comment: Chronic and progressive with behaviors during cares  Plan: appropriate for LTC, staff assist with cares, redirect behaviors, staff update if continue with difficulty with cares, may need to premedicate if continues to refuses bathes/sit in soiled clothing    (M15.0) Primary osteoarthritis involving multiple joints  Comment: Chronic, deformities hands, decreased mobilities  Plan: change APAP to prn--staff update if having pain   -assist transfers and cares    (H00.15) Chalazion of left lower eyelid  Comment: acute, not causing pain or discomfort   Plan: antibiotics not indicated, can take multiple weeks or months to resolve  -warm compresses QID, staff update if new or worsening symptoms (drainage, pain, itching, changes in vision, erythema to sclera, ect)      Electronically signed by:  JOSE Ennis CNP

## 2018-02-07 VITALS
BODY MASS INDEX: 32.04 KG/M2 | OXYGEN SATURATION: 97 % | HEART RATE: 69 BPM | DIASTOLIC BLOOD PRESSURE: 68 MMHG | SYSTOLIC BLOOD PRESSURE: 133 MMHG | TEMPERATURE: 97.8 F | RESPIRATION RATE: 16 BRPM | HEIGHT: 60 IN | WEIGHT: 163.2 LBS

## 2018-02-08 ENCOUNTER — NURSING HOME VISIT (OUTPATIENT)
Dept: GERIATRICS | Facility: CLINIC | Age: 83
End: 2018-02-08
Payer: MEDICARE

## 2018-02-08 DIAGNOSIS — E03.9 ACQUIRED HYPOTHYROIDISM: Primary | ICD-10-CM

## 2018-02-08 DIAGNOSIS — M15.0 PRIMARY OSTEOARTHRITIS INVOLVING MULTIPLE JOINTS: ICD-10-CM

## 2018-02-08 DIAGNOSIS — F02.818 LATE ONSET ALZHEIMER'S DISEASE WITH BEHAVIORAL DISTURBANCE (H): ICD-10-CM

## 2018-02-08 DIAGNOSIS — G30.1 LATE ONSET ALZHEIMER'S DISEASE WITH BEHAVIORAL DISTURBANCE (H): ICD-10-CM

## 2018-02-08 PROCEDURE — 99308 SBSQ NF CARE LOW MDM 20: CPT | Performed by: INTERNAL MEDICINE

## 2018-02-15 PROBLEM — G30.1 LATE ONSET ALZHEIMER'S DISEASE WITH BEHAVIORAL DISTURBANCE (H): Status: ACTIVE | Noted: 2018-02-15

## 2018-02-15 PROBLEM — F02.818 LATE ONSET ALZHEIMER'S DISEASE WITH BEHAVIORAL DISTURBANCE (H): Status: ACTIVE | Noted: 2018-02-15

## 2018-03-12 ENCOUNTER — NURSING HOME VISIT (OUTPATIENT)
Dept: GERIATRICS | Facility: CLINIC | Age: 83
End: 2018-03-12
Payer: MEDICARE

## 2018-03-12 VITALS
HEIGHT: 60 IN | RESPIRATION RATE: 20 BRPM | SYSTOLIC BLOOD PRESSURE: 122 MMHG | WEIGHT: 142.5 LBS | DIASTOLIC BLOOD PRESSURE: 63 MMHG | OXYGEN SATURATION: 97 % | HEART RATE: 64 BPM | TEMPERATURE: 98.5 F | BODY MASS INDEX: 27.98 KG/M2

## 2018-03-12 DIAGNOSIS — E03.9 ACQUIRED HYPOTHYROIDISM: ICD-10-CM

## 2018-03-12 DIAGNOSIS — G30.1 LATE ONSET ALZHEIMER'S DISEASE WITH BEHAVIORAL DISTURBANCE (H): Primary | ICD-10-CM

## 2018-03-12 DIAGNOSIS — K59.00 CONSTIPATION, UNSPECIFIED CONSTIPATION TYPE: ICD-10-CM

## 2018-03-12 DIAGNOSIS — I10 BENIGN ESSENTIAL HYPERTENSION: ICD-10-CM

## 2018-03-12 DIAGNOSIS — F02.818 LATE ONSET ALZHEIMER'S DISEASE WITH BEHAVIORAL DISTURBANCE (H): Primary | ICD-10-CM

## 2018-03-12 DIAGNOSIS — H00.15 CHALAZION OF LEFT LOWER EYELID: ICD-10-CM

## 2018-03-12 DIAGNOSIS — Z71.89 ADVANCED DIRECTIVES, COUNSELING/DISCUSSION: ICD-10-CM

## 2018-03-12 DIAGNOSIS — M15.0 PRIMARY OSTEOARTHRITIS INVOLVING MULTIPLE JOINTS: ICD-10-CM

## 2018-03-12 PROCEDURE — 99318 ZZC ANNUAL NURSING FAC ASSESSMNT, STABLE: CPT | Performed by: NURSE PRACTITIONER

## 2018-03-12 NOTE — PROGRESS NOTES
[place] [facility]  Chief Complaint   Patient presents with     Annual Comprehensive Nursing Home       HPI:    Alondra Thompson is a 89 year old  (2/27/1919), who is being seen today for an annual comprehensive visit at [facility].  HPI information obtained from: facility chart records, facility staff and [facility] chart review.  Today's concerns are:    Late onset Alzheimer's disease with behavioral disturbance  Chronic and progressive.  Behaviors with cares at times, per NH notes can refuse to take bathes, does not like assistance in bathroom.  Her behaviors with staff often include yelling and cursing and occasional combativeness.  Eating and sleeping well per staff.  Weight has been stable, monitored monthly, most recent weight 20lbs less then prior month, question accuracy.  Per NH records she is eating well, generally 75%-100% of meals.  Dietary follows     Wt Readings from Last 3 Encounters:   03/12/18 142 lb 8 oz (64.6 kg)   02/07/18 163 lb 3.2 oz (74 kg)   01/04/18 161 lb 4.8 oz (73.2 kg)       Acquired hypothyroidism  Chronic, on synthroid.   Lab Results   Component Value Date    TSH 4.74 07/13/2017         Primary osteoarthritis involving multiple joints  Per history to multiple joints.  Denies/no reports of pain.  No longer ambulatory.  No recent falls or injuries reported.  Has prn APAP with good effect    Chalazion of left lower eyelid  Noted in January, lesion is now gone, still with mild erythema to conjunctiva of left eye, but no drainage, purulence noted, PERRL, EOM intact.  Patient denies/no reports of pain to eye, itching to eye, or changes in vision.     Constipation, unspecified constipation type  Per history, has prn miralex.  Per records having regular BM.  Denies/no reports of constipation or diarrhea, abdominal pain        Benign essential hypertension  Per history, not currently on antihypertensives.  Denies/no reports of chest pain,  palpitations, dizziness or lightheadedness, h/a  Lab Results   Component Value Date    POTASSIUM 4.4 12/18/2017     Lab Results   Component Value Date    CR 0.73 12/18/2017     Last 3 BPs: 122/63, 138/71, 132/61    Advanced directives, counseling/discussion  Current DNR/DNI, comfort focus.  Previously, patient's younger sister, Hansa, was her healthcare agent.  She passed away unexpectedly last year and patient now has legal guardian, Angie Hare.  I called to discuss POC and ACP, no answer, left message           Current Weight: 142.5 lbs    ALLERGIES: Macrodantin [nitrofurantoin]  PROBLEM LIST:  Patient Active Problem List   Diagnosis     Osteoarthritis     Risk for falls     Hypothyroidism     Advance Care Planning     Annual physical exam 4/9/15     Late onset Alzheimer's disease without behavioral disturbance     Chronic pain of both knees     Alzheimer's dementia with behavioral disturbance, unspecified timing of dementia onset     Constipation, unspecified constipation type     Late onset Alzheimer's disease with behavioral disturbance     PAST MEDICAL HISTORY:  has a past medical history of Arthritis; Dementia; and Hypothyroidism.  PAST SURGICAL HISTORY:  has a past surgical history that includes orthopedic surgery; hernia repair; and Abdomen surgery.  FAMILY HISTORY: family history is not on file.  SOCIAL HISTORY:  reports that she has never smoked. She does not have any smokeless tobacco history on file. She reports that she drinks alcohol.  IMMUNIZATIONS:  Most Recent Immunizations   Administered Date(s) Administered     Influenza (High Dose) 3 valent vaccine 10/17/2016     Influenza (IIV3) PF 10/08/2015     Pneumo Conj 13-V (2010&after) 10/14/2015     Pneumococcal 23 valent 04/08/2014     TD (ADULT, 7+) 03/29/2011     Above immunizations pulled from [facility] [place]. [facility] and facility records also reconciled. Outstanding information sent to  to update [facility] [place].  Future immunizations are  not needed at this point as all recommended immunizations are up to date.   MEDICATIONS:  Current Outpatient Prescriptions   Medication Sig Dispense Refill     ACETAMINOPHEN PO Take 500 mg by mouth 2 times daily as needed for pain       levothyroxine (SYNTHROID) 112 MCG tablet Take 1 tablet (112 mcg) by mouth daily 90 tablet 11     ammonium lactate (AMLACTIN) 12 % cream Apply 1 g topically 2 times daily 385 g 11     nystatin (MYCOSTATIN) 405422 UNIT/GM POWD Apply 1 g topically 2 times daily as needed 60 g 11     polyethylene glycol (MIRALAX/GLYCOLAX) Packet Take 17 g by mouth 2 times daily as needed for constipation 24 packet 11     Medications reviewed:  Medications reconciled to facility chart and changes were made to reflect current medications as identified as above med list. Below are the changes that were made:   Medications stopped since last EPIC medication reconciliation:   There are no discontinued medications.    Medications started since last [facility] medication reconciliation:  No orders of the defined types were placed in this encounter.        Case Management:  I have reviewed the facility/SNF care plan/MDS which was done February 2018, including the falls risk, nutrition and pain screening. I also reviewed the current immunizations, and preventive care..Future cancer screening is not clinically indicated secondary to age/goals of care Patient's desire to return to the community is not present. Current Level of Care is appropriate.    Advance Directive Discussion:    I reviewed the current advanced directives as reflected in EPIC, the POLST and the facility chart, and verified the congruency of orders. I contacted the first party legal guardian, Angie Hare and left a message regarding the plan of Care.  I did not due to cognitive impairment review the advance directives with the resident.     Team Discussion:  I communicated with the appropriate disciplines involved with the Plan of Care:   Nursing   "    Patient Goal:  Patient's goal is unobtainable secondary to cognitive impairment.    Information reviewed:  Medications, vital signs, orders, and nursing notes.    ROS:  Limited secondary to cognitive impairment but today pt reports \"pushing the heck out of 100\"    Exam:  /63  Pulse 64  Temp 98.5  F (36.9  C)  Resp 20  Ht 5' (1.524 m)  Wt 142 lb 8 oz (64.6 kg)  SpO2 97%  BMI 27.83 kg/m2  GENERAL APPEARANCE:  Alert, in no distress, sitting up in w/c in sunroom  ENT:  Mouth and posterior oropharynx normal, moist mucous membranes, poor dentition, missing multiple teeth  Breast: patient declines  RESP:  respiratory effort and palpation of chest normal, lungs clear to auscultation , no respiratory distress  CV:  Palpation and auscultation of heart done , regular rate and rhythm,   2/6 murmur, no rub, or gallop, peripheral edema 2+ in BLLE---baseline  ABDOMEN:  normal bowel sounds, soft, nontender, no hepatosplenomegaly or other masses, obese  M/S:   Gait and station abnormal primarily w/c bound, CMS intact, no erythema/edema of joints, deforming changes of OA in both hands  NEURO:   Cranial nerves 2-12 are normal tested and grossly at patient's baseline, speech clear, no tremor   PSYCH:  oriented to person, place, insight and judgement impaired, memory impaired, pleasant during encounter        Lab/Diagnostic data:     Last Basic Metabolic Panel:  Recent Labs   Lab Test 12/18/17 04/07/16 03/17/14   1410   NA  142  141   < >  141   POTASSIUM  4.4  4.2   < >  3.8   CHLORIDE  110*  106   < >  102   FABIO  9.0  9.6   < >  9.3   CO2  26   --    --   28   BUN  16  11   < >  16   CR  0.73  0.73   < >  0.87   GLC  74  80   < >  90    < > = values in this interval not displayed.       ASSESSMENT/PLAN  (G30.1,  F02.81) Late onset Alzheimer's disease with behavioral disturbance  (primary encounter diagnosis)  Comment: Chronic and progressive with behaviors during cares  Plan: appropriate for LTC, staff assist with " cares, redirect behaviors, staff update if continue with difficulty with cares, may need to premedicate if continues to refuses bathes/sit in soiled clothing  -staff to re-weight patient and update NP, do not think 20lb weight loss accurate.  Would anticipate some weight loss with progression in dementia  -dietary following     (E03.9) Acquired hypothyroidism  Comment: Chronic, stable  Plan: continue synthroid  -TSH Thursday, goal 4-5    (M15.0) Primary osteoarthritis involving multiple joints  Comment: Chronic, deformities hands, decreased mobilities  Plan: APAP prn--staff update if having pain   -assist transfers and cares    (H00.15) Chalazion of left lower eyelid  Comment: noted in January, improving, no s/s of infection, no change in vision  Plan: monitor staff to update with changes    (K59.00) Constipation, unspecified constipation type  Comment: Chronic, no issues reported  Plan: prn bowel meds    (I10) Benign essential hypertension  BP goals are <150/90 mm Hg.This is higher than ACC and AHA recommendations due to goals of care and risk for hypotension. Patient is stable and continue without pharmacological invention with routine assessment.  -BMP Thursday    (Z71.89) Advanced directives, counseling/discussion  Comment: has been DNR/DNI, comfort focus,now with new legal guardian, Angie Hare, I did call and leave a VM.  New POLST completed updating patient's healthcare agent change        Electronically signed by:  JOSE Ennis CNP

## 2018-03-14 ENCOUNTER — RECORDS - HEALTHEAST (OUTPATIENT)
Dept: LAB | Facility: CLINIC | Age: 83
End: 2018-03-14

## 2018-03-15 ENCOUNTER — TRANSFERRED RECORDS (OUTPATIENT)
Dept: HEALTH INFORMATION MANAGEMENT | Facility: CLINIC | Age: 83
End: 2018-03-15

## 2018-03-15 LAB
ANION GAP SERPL CALCULATED.3IONS-SCNC: 7 MMOL/L (ref 5–18)
ANION GAP SERPL CALCULATED.3IONS-SCNC: 7 MMOL/L (ref 5–18)
BUN SERPL-MCNC: 16 MG/DL (ref 8–28)
BUN SERPL-MCNC: 16 MG/DL (ref 8–28)
CALCIUM SERPL-MCNC: 8.9 MG/DL (ref 8.5–10.5)
CALCIUM SERPL-MCNC: 8.9 MG/DL (ref 8.5–10.5)
CHLORIDE BLD-SCNC: 108 MMOL/L (ref 98–107)
CHLORIDE SERPLBLD-SCNC: 108 MMOL/L (ref 98–107)
CO2 SERPL-SCNC: 26 MMOL/L (ref 22–31)
CO2 SERPL-SCNC: 26 MMOL/L (ref 22–31)
CREAT SERPL-MCNC: 0.71 MG/DL (ref 0.6–1.1)
CREAT SERPL-MCNC: 0.71 MG/DL (ref 0.6–1.1)
GFR SERPL CREATININE-BSD FRML MDRD: >60 ML/MIN/1.73M2
GFR SERPL CREATININE-BSD FRML MDRD: >60 ML/MIN/1.73M2
GLUCOSE BLD-MCNC: 87 MG/DL (ref 70–125)
GLUCOSE SERPL-MCNC: 87 MG/DL (ref 70–125)
POTASSIUM BLD-SCNC: 4.1 MMOL/L (ref 3.5–5)
POTASSIUM SERPL-SCNC: 4.1 MMOL/L (ref 3.5–5)
SODIUM SERPL-SCNC: 141 MMOL/L (ref 136–145)
SODIUM SERPL-SCNC: 141 MMOL/L (ref 136–145)
TSH SERPL DL<=0.005 MIU/L-ACNC: 3.64 UIU/ML (ref 0.3–5)
TSH SERPL-ACNC: 3.64 UIU/ML (ref 0.3–5)

## 2018-04-02 ENCOUNTER — NURSING HOME VISIT (OUTPATIENT)
Dept: GERIATRICS | Facility: CLINIC | Age: 83
End: 2018-04-02
Payer: MEDICARE

## 2018-04-02 VITALS
SYSTOLIC BLOOD PRESSURE: 122 MMHG | WEIGHT: 160.3 LBS | HEART RATE: 74 BPM | RESPIRATION RATE: 16 BRPM | OXYGEN SATURATION: 97 % | TEMPERATURE: 97.5 F | HEIGHT: 60 IN | BODY MASS INDEX: 31.47 KG/M2 | DIASTOLIC BLOOD PRESSURE: 79 MMHG

## 2018-04-02 DIAGNOSIS — F02.818 LATE ONSET ALZHEIMER'S DISEASE WITH BEHAVIORAL DISTURBANCE (H): Primary | ICD-10-CM

## 2018-04-02 DIAGNOSIS — I10 BENIGN ESSENTIAL HYPERTENSION: ICD-10-CM

## 2018-04-02 DIAGNOSIS — K59.00 CONSTIPATION, UNSPECIFIED CONSTIPATION TYPE: ICD-10-CM

## 2018-04-02 DIAGNOSIS — M15.0 PRIMARY OSTEOARTHRITIS INVOLVING MULTIPLE JOINTS: ICD-10-CM

## 2018-04-02 DIAGNOSIS — G30.1 LATE ONSET ALZHEIMER'S DISEASE WITH BEHAVIORAL DISTURBANCE (H): Primary | ICD-10-CM

## 2018-04-02 DIAGNOSIS — E03.9 ACQUIRED HYPOTHYROIDISM: ICD-10-CM

## 2018-04-02 PROCEDURE — 99309 SBSQ NF CARE MODERATE MDM 30: CPT | Performed by: NURSE PRACTITIONER

## 2018-04-02 NOTE — PROGRESS NOTES
[facility]    Chief Complaint   Patient presents with     long term Regulatory       HPI:    Alondra Thompson is a 89 year old  (2/27/1919), who is being seen today for a federally mandated E/M visit at [facility].  HPI information obtained from: facility chart records, facility staff and patient report. Today's concerns are:    Late onset Alzheimer's disease with behavioral disturbance  Chronic and progressive.  Behaviors with cares at times, per NH notes can refuse to take bathes, does not like assistance in bathroom.  Her behaviors with staff often include yelling and cursing and occasional combativeness.  Eating and sleeping well per staff.  Weight stable at 160lbs.      Wt Readings from Last 3 Encounters:   04/02/18 160 lb 4.8 oz (72.7 kg)   03/12/18 142 lb 8 oz (64.6 kg)   02/07/18 163 lb 3.2 oz (74 kg)       Acquired hypothyroidism  Chronic, on synthroid.   Lab Results   Component Value Date    TSH 3.64 03/15/2018       Primary osteoarthritis involving multiple joints  Per history to multiple joints.  Denies/no reports of pain.  No longer ambulatory.  No recent falls or injuries reported.  Has prn APAP with good effect       Benign essential hypertension  Per history, not currently on antihypertensives.  Denies/no reports of chest pain, palpitations, dizziness or lightheadedness, h/a  Lab Results   Component Value Date    CR 0.71 03/15/2018     Lab Results   Component Value Date    POTASSIUM 4.1 03/15/2018     Last 3 BPs: 122/79, 149/72, 122/63    Constipation, unspecified constipation type  Per history, has prn miralex.  Per records having regular BM.  Denies/no reports of constipation or diarrhea, abdominal pain      Current Weight: 160.3 lbs      ALLERGIES: Macrodantin [nitrofurantoin]  PAST MEDICAL HISTORY:  has a past medical history of Arthritis; Dementia; and Hypothyroidism.  PAST SURGICAL HISTORY:  has a past surgical history that includes  "orthopedic surgery; hernia repair; and Abdomen surgery.  FAMILY HISTORY: family history is not on file.  SOCIAL HISTORY:  reports that she has never smoked. She does not have any smokeless tobacco history on file. She reports that she drinks alcohol.    MEDICATIONS:  Current Outpatient Prescriptions   Medication Sig Dispense Refill     ACETAMINOPHEN PO Take 500 mg by mouth 2 times daily as needed for pain       levothyroxine (SYNTHROID) 112 MCG tablet Take 1 tablet (112 mcg) by mouth daily 90 tablet 11     ammonium lactate (AMLACTIN) 12 % cream Apply 1 g topically 2 times daily 385 g 11     nystatin (MYCOSTATIN) 330071 UNIT/GM POWD Apply 1 g topically 2 times daily as needed 60 g 11     polyethylene glycol (MIRALAX/GLYCOLAX) Packet Take 17 g by mouth 2 times daily as needed for constipation 24 packet 11     Medications reviewed:  Medications reconciled to facility chart and changes were made to reflect current medications as identified as above med list. Below are the changes that were made:   Medications stopped since last EPIC medication reconciliation:   There are no discontinued medications.    Medications started since last [facility] medication reconciliation:  No orders of the defined types were placed in this encounter.        Case Management:  I have reviewed the care plan and MDS and do agree with the plan. Patient's desire to return to the community is not assessible due to cognitive impairment.  Information reviewed:  Medications, vital signs, orders, and nursing notes.    ROS:  Limited secondary to cognitive impairment but today pt reports \"pushing the heck out of 100\"    Exam:  Vitals: /79  Pulse 74  Temp 97.5  F (36.4  C)  Resp 16  Ht 5' (1.524 m)  Wt 160 lb 4.8 oz (72.7 kg)  SpO2 97%  BMI 31.31 kg/m2  BMI= Body mass index is 31.31 kg/(m^2).  GENERAL APPEARANCE:  resting in bed, in no distress  ENT:  Mouth and posterior oropharynx normal, moist mucous membranes, poor dentition, missing multiple " teeth  RESP:  respiratory effort and palpation of chest normal, lungs clear to auscultation , no respiratory distress  CV:  Palpation and auscultation of heart done , regular rate and rhythm,   2/6 murmur, no rub, or gallop, peripheral edema 2+ in BLLE---baseline  ABDOMEN:  normal bowel sounds, soft, nontender, no hepatosplenomegaly or other masses, obese  M/S:   Gait and station abnormal primarily w/c bound, CMS intact, no erythema/edema of joints, deforming changes of OA in both hands  PSYCH:  oriented to person, place, insight and judgement impaired, memory impaired    Lab/Diagnostic data:     Last Basic Metabolic Panel:  Recent Labs   Lab Test 03/15/18 12/18/17   NA  141  142   POTASSIUM  4.1  4.4   CHLORIDE  108*  110*   FABIO  8.9  9.0   CO2  26  26   BUN  16  16   CR  0.71  0.73   GLC  87  74       TSH   Date Value Ref Range Status   03/15/2018 3.64 0.30 - 5.00 uIU/mL Final   07/13/2017 4.74 0.30 - 5.00 uIU/mL Final       ASSESSMENT/PLAN  (G30.1,  F02.81) Late onset Alzheimer's disease with behavioral disturbance  (primary encounter diagnosis)  Comment: Chronic and progressive with behaviors during cares  Plan: appropriate for LTC, staff assist with cares, redirect behaviors, staff update if continue with difficulty with cares, may need to premedicate if continues to refuses bathes/sit in soiled clothing    (E03.9) Acquired hypothyroidism  Comment: Chronic, stable  Plan: continue synthroid  -TSH annually goal 4-5    (M15.0) Primary osteoarthritis involving multiple joints  Comment: Chronic, deformities hands, decreased mobilities  Plan: APAP prn--staff update if having pain   -assist transfers and cares    (I10) Benign essential hypertension  Comment: chronic, not currently medicated  Plan: continue to monitor, periodic BMP's    (K59.00) Constipation, unspecified constipation type  Comment: per history, no issues reported by staff  Plan: prn bowel meds        Total time spent with patient visit at the skilled  nursing facility was 25 min including patient visit and review of past records. Greater than 50% of total time spent with counseling and coordinating care     Electronically signed by:  JOSE Ennis CNP

## 2018-05-18 ENCOUNTER — TELEPHONE (OUTPATIENT)
Dept: GERIATRICS | Facility: CLINIC | Age: 83
End: 2018-05-18

## 2018-05-21 ENCOUNTER — NURSING HOME VISIT (OUTPATIENT)
Dept: GERIATRICS | Facility: CLINIC | Age: 83
End: 2018-05-21
Payer: MEDICARE

## 2018-05-21 VITALS
RESPIRATION RATE: 18 BRPM | DIASTOLIC BLOOD PRESSURE: 67 MMHG | HEART RATE: 81 BPM | HEIGHT: 60 IN | SYSTOLIC BLOOD PRESSURE: 105 MMHG | WEIGHT: 161.8 LBS | TEMPERATURE: 98.2 F | BODY MASS INDEX: 31.77 KG/M2 | OXYGEN SATURATION: 97 %

## 2018-05-21 DIAGNOSIS — G30.1 LATE ONSET ALZHEIMER'S DISEASE WITH BEHAVIORAL DISTURBANCE (H): ICD-10-CM

## 2018-05-21 DIAGNOSIS — L03.115 CELLULITIS OF RIGHT LOWER EXTREMITY: Primary | ICD-10-CM

## 2018-05-21 DIAGNOSIS — K59.00 CONSTIPATION, UNSPECIFIED CONSTIPATION TYPE: ICD-10-CM

## 2018-05-21 DIAGNOSIS — F02.818 LATE ONSET ALZHEIMER'S DISEASE WITH BEHAVIORAL DISTURBANCE (H): ICD-10-CM

## 2018-05-21 PROCEDURE — 99309 SBSQ NF CARE MODERATE MDM 30: CPT | Performed by: NURSE PRACTITIONER

## 2018-05-21 RX ORDER — POLYETHYLENE GLYCOL 3350 17 G/17G
17 POWDER, FOR SOLUTION ORAL
COMMUNITY

## 2018-05-21 NOTE — PROGRESS NOTES
[facility]    Chief Complaint   Patient presents with     jail Acute       [facility] Medical Record Number:  [id number]    HPI:    Alondra Thompson is a 89 year old  (2/27/1919), who is being seen today for an episodic care visit at [facility].  HPI information obtained from: facility chart records, facility staff, patient report and [facility] chart review.Today's concern is:    Cellulitis of right lower extremity  Patient seen today to follow-up on recent infection.  RLE open area with increased erythema and warmth noted during bath audit by NH staff on 5/18.  On-call was notified and patient started on Keflex.      Today patient reports pain gone, her vitals have been stable, afebrile.  Her leg is no longer with excess warmth or erythema.  Scab noted to RLE, healing    Late onset Alzheimer's disease with behavioral disturbance  Chronic and progressive.  Behaviors with cares at times, per NH notes can refuse to take bathes, does not like assistance in bathroom.  Her behaviors with staff often include yelling and cursing and occasional combativeness.  Eating and sleeping well per staff.  Weight stable at 160lbs.      Wt Readings from Last 3 Encounters:   05/21/18 161 lb 12.8 oz (73.4 kg)   04/02/18 160 lb 4.8 oz (72.7 kg)   03/12/18 142 lb 8 oz (64.6 kg)       Constipation, unspecified constipation type  Per history, has  miralex scheduled M, W, F and prn bowel meds.  Per records having regular BM.  Denies/no reports of constipation or diarrhea, abdominal pain        Last 3 BPs: 105/67, 128/77, 132/54    ALLERGIES: Macrodantin [nitrofurantoin]  Past Medical, Surgical, Family and Social History reviewed and updated in [facility].    Current Outpatient Prescriptions   Medication Sig Dispense Refill     ACETAMINOPHEN PO Take 500 mg by mouth 2 times daily as needed for pain       ammonium lactate (AMLACTIN) 12 % cream Apply 1 g topically 2 times daily 385 g 11      "Cephalexin (KEFLEX PO) Take 500 mg by mouth 4 times daily       levothyroxine (SYNTHROID) 112 MCG tablet Take 1 tablet (112 mcg) by mouth daily 90 tablet 11     nystatin (MYCOSTATIN) 948793 UNIT/GM POWD Apply 1 g topically 2 times daily as needed 60 g 11     polyethylene glycol (MIRALAX/GLYCOLAX) Packet Take 17 g by mouth 2 times daily as needed for constipation 24 packet 11     polyethylene glycol (MIRALAX/GLYCOLAX) Packet Take 17 g by mouth three times a week On M/W/F       Medications reviewed:  Medications reconciled to facility chart and changes were made to reflect current medications as identified as above med list. Below are the changes that were made:   Medications stopped since last EPIC medication reconciliation:   There are no discontinued medications.    Medications started since last [facility] medication reconciliation:  Orders Placed This Encounter   Medications     Cephalexin (KEFLEX PO)     Sig: Take 500 mg by mouth 4 times daily     polyethylene glycol (MIRALAX/GLYCOLAX) Packet     Sig: Take 17 g by mouth three times a week On M/W/F         REVIEW OF SYSTEMS:  Limited secondary to cognitive impairment but today pt reports \"leg feels great, pushing the heck out of 100\"    Physical Exam:  /67  Pulse 81  Temp 98.2  F (36.8  C)  Resp 18  Ht 5' (1.524 m)  Wt 161 lb 12.8 oz (73.4 kg)  SpO2 97%  BMI 31.6 kg/m2  GENERAL APPEARANCE:  sitting up in w/c in sunroom   RESP:  respiratory effort and palpation of chest normal, lungs clear to auscultation , no respiratory distress  CV:  Palpation and auscultation of heart done , regular rate and rhythm,   2/6 murmur, no rub, or gallop, peripheral edema 2+ in BLLE---baseline  ABDOMEN:  normal bowel sounds, soft, nontender, no hepatosplenomegaly or other masses, obese  Skin: RLE with are previous area of erythema marked with sharpie, erythema fading, no excess warmth, no drainage noted from wound, wound scabbing over, no tenderness to palpation   M/S:   Gait " and station abnormal primarily w/c bound, CMS intact, no erythema/edema of joints, deforming changes of OA in both hands  PSYCH:  oriented to person, place, insight and judgement impaired, memory impaired    Recent Labs:     Last Basic Metabolic Panel:  Recent Labs   Lab Test 03/15/18 12/18/17   NA  141  142   POTASSIUM  4.1  4.4   CHLORIDE  108*  110*   FABIO  8.9  9.0   CO2  26  26   BUN  16  16   CR  0.71  0.73   GLC  87  74       TSH   Date Value Ref Range Status   03/15/2018 3.64 0.30 - 5.00 uIU/mL Final   07/13/2017 4.74 0.30 - 5.00 uIU/mL Final       Assessment/Plan:  (L03.115) Cellulitis of right lower extremity  (primary encounter diagnosis)  Comment: reported on 5/18, improving  Plan: continue Keflex and monitoring site, staff to update with concerns     (G30.1,  F02.81) Late onset Alzheimer's disease with behavioral disturbance  Comment: Chronic and progressive with behaviors during cares  Plan: appropriate for LTC, staff assist with cares, redirect behaviors, staff update if continue with difficulty with cares, may need to premedicate if continues to refuses bathes/sit in soiled clothing    (K59.00) Constipation, unspecified constipation type  Comment: per history, no issues reported by staff  Plan: CHRISTINA Meyer, F   prn bowel meds    Electronically signed by  JOSE Ennis CNP

## 2018-06-11 ENCOUNTER — NURSING HOME VISIT (OUTPATIENT)
Dept: GERIATRICS | Facility: CLINIC | Age: 83
End: 2018-06-11
Payer: MEDICARE

## 2018-06-11 VITALS
DIASTOLIC BLOOD PRESSURE: 66 MMHG | HEART RATE: 88 BPM | SYSTOLIC BLOOD PRESSURE: 124 MMHG | OXYGEN SATURATION: 97 % | TEMPERATURE: 97.1 F | RESPIRATION RATE: 18 BRPM | HEIGHT: 60 IN | BODY MASS INDEX: 31.77 KG/M2 | WEIGHT: 161.8 LBS

## 2018-06-11 DIAGNOSIS — G30.1 LATE ONSET ALZHEIMER'S DISEASE WITH BEHAVIORAL DISTURBANCE (H): ICD-10-CM

## 2018-06-11 DIAGNOSIS — F02.818 LATE ONSET ALZHEIMER'S DISEASE WITH BEHAVIORAL DISTURBANCE (H): ICD-10-CM

## 2018-06-11 DIAGNOSIS — E03.9 ACQUIRED HYPOTHYROIDISM: ICD-10-CM

## 2018-06-11 DIAGNOSIS — M15.0 PRIMARY OSTEOARTHRITIS INVOLVING MULTIPLE JOINTS: Primary | ICD-10-CM

## 2018-06-11 PROCEDURE — 99308 SBSQ NF CARE LOW MDM 20: CPT | Performed by: INTERNAL MEDICINE

## 2018-06-11 NOTE — LETTER
6/11/2018        RE: Alondra Thompson  [facility]  [address]  [address]  [facility] [address]        Alondra Thompson is a 89 year old female seen June 11, 2018 at [facility] where she has resided for 4 years (admit 3/2014) seen to follow up dementia, OA.     Patient is seen on the unit, up to WC.  Sleeping in front of the bird cage.    She had an episode of RLE cellulitis last month, resolved with abx.   No new concerns reported by nursing staff today.     She has progressive dementia, low functional status, however does not like assistance with her personal cares.   She can be resistant, yelling and cursing, occ combative with bathing, hygiene.    Patient has OA / DJD with knee pain as primary symptom  No pain when sitting in her WC, which she uses for all destinations.    Has h/o hypothyroidism and is on replacement    Missing most of her teeth but still eats well and weight is stable.     Past Medical History:   Diagnosis Date     Arthritis      Dementia      Hypothyroidism      SH:    Single, previously lived with her sister, house in [place].   Retired from work for the [employer]      Patient's sister Hansa passed away, and now patient has a legal guardian Angie Hare.       ROS:  Limited, but negative other than above.   Wt Readings from Last 5 Encounters:   06/11/18 161 lb 12.8 oz (73.4 kg)   05/21/18 161 lb 12.8 oz (73.4 kg)   04/02/18 160 lb 4.8 oz (72.7 kg)   03/12/18 142 lb 8 oz (64.6 kg)   02/07/18 163 lb 3.2 oz (74 kg)        EXAM:  NAD  /66  Pulse 88  Temp 97.1  F (36.2  C)  Resp 18  Ht 5' (1.524 m)  Wt 161 lb 12.8 oz (73.4 kg)  SpO2 97%  BMI 31.6 kg/m2   Missing all her upper teeth.  Neck supple without adenopathy  Lung clear bilaterally with fair air movement  Heart RRR s1s2, 1/6 JULI  Abd obese, soft, NT, no distention, +BS  Ext without edema     Deforming changes of OA in both hands.     Psych: affect okay  Neuro: no focal deficits,  confused.     Labs reviewed    IMP/PLAN:  (M15.0) Primary osteoarthritis involving multiple joints    Comment: with deformities, some pain and decreased mobility     Plan:  prn acetaminophen, local measures.         (E03.9) Acquired hypothyroidism  Comment:   TSH   Date Value Ref Range Status   03/15/2018 3.64 0.30 - 5.00 uIU/mL Final     Plan: yearly TSH, goal 3-6       (G30.1,  F02.81) Late onset Alzheimer's disease with behavioral disturbance  Comment: gradual decline, low functional status   Plan: LTC support for assist with meds, meals, activity, mobility and safety.     Staff has been able to use non-pharmacologic approaches for her behaviors, to date.     Will follow, may need to add med prior to cares to allow for basic hygiene.        Adrianne Vila MD       Sincerely,        Adrianne Vila MD

## 2018-06-15 NOTE — PROGRESS NOTES
Alondra Thompson is a 89 year old female seen June 11, 2018 at [facility] where she has resided for 4 years (admit 3/2014) seen to follow up dementia, OA.     Patient is seen on the unit, up to WC.  Sleeping in front of the bird cage.    She had an episode of RLE cellulitis last month, resolved with abx.   No new concerns reported by nursing staff today.     She has progressive dementia, low functional status, however does not like assistance with her personal cares.   She can be resistant, yelling and cursing, occ combative with bathing, hygiene.    Patient has OA / DJD with knee pain as primary symptom  No pain when sitting in her WC, which she uses for all destinations.    Has h/o hypothyroidism and is on replacement    Missing most of her teeth but still eats well and weight is stable.     Past Medical History:   Diagnosis Date     Arthritis      Dementia      Hypothyroidism      SH:    Single, previously lived with her sister, house in [place].   Retired from work for the [employer] company      Patient's sister Hansa passed away, and now patient has a legal guardian Angie H[facility].       ROS:  Limited, but negative other than above.   Wt Readings from Last 5 Encounters:   06/11/18 161 lb 12.8 oz (73.4 kg)   05/21/18 161 lb 12.8 oz (73.4 kg)   04/02/18 160 lb 4.8 oz (72.7 kg)   03/12/18 142 lb 8 oz (64.6 kg)   02/07/18 163 lb 3.2 oz (74 kg)        EXAM:  NAD  /66  Pulse 88  Temp 97.1  F (36.2  C)  Resp 18  Ht 5' (1.524 m)  Wt 161 lb 12.8 oz (73.4 kg)  SpO2 97%  BMI 31.6 kg/m2   Missing all her upper teeth.  Neck supple without adenopathy  Lung clear bilaterally with fair air movement  Heart RRR s1s2, 1/6 JULI  Abd obese, soft, NT, no distention, +BS  Ext without edema     Deforming changes of OA in both hands.     Psych: affect okay  Neuro: no focal deficits, confused.     Labs reviewed    IMP/PLAN:  (M15.0) Primary osteoarthritis involving multiple joints    Comment: with  deformities, some pain and decreased mobility     Plan:  prn acetaminophen, local measures.         (E03.9) Acquired hypothyroidism  Comment:   TSH   Date Value Ref Range Status   03/15/2018 3.64 0.30 - 5.00 uIU/mL Final     Plan: yearly TSH, goal 3-6       (G30.1,  F02.81) Late onset Alzheimer's disease with behavioral disturbance  Comment: gradual decline, low functional status   Plan: LTC support for assist with meds, meals, activity, mobility and safety.     Staff has been able to use non-pharmacologic approaches for her behaviors, to date.     Will follow, may need to add med prior to cares to allow for basic hygiene.        Adrianne Vila MD

## 2018-08-06 NOTE — LETTER
8/6/2018        RE: Alondra Thompson  [facility]  [address]  [address]  [facility] [address]          [facility]    Chief Complaint   Patient presents with     penitentiary Regulatory       [facility] Medical Record Number:  [id number]    HPI:    Alondra Thompson is a 89 year old  (2/27/1919), who is being seen today for a federally mandated E/M visit at [facility].  HPI information obtained from: facility chart records, facility staff and patient report. Today's concerns are:    Late onset Alzheimer's disease with behavioral disturbance  Chronic and progressive.  Behaviors with cares at times, per NH notes can refuse to take bathes, does not like assistance in bathroom.  Her behaviors with staff often include yelling and cursing and occasional combativeness, generally redirectable.  Eating and sleeping well per staff.  No recent weight gain    Primary osteoarthritis involving multiple joints  Per history to multiple joints.  Denies/no reports of pain.  No longer ambulatory.  No recent falls or injuries reported.  Has prn APAP with good effect     Acquired hypothyroidism  Chronic, on synthroid.   Lab Results   Component Value Date    TSH 3.64 03/15/2018       Constipation, unspecified constipation type  Per history, has prn miralex.  Per records having regular BM.  Denies/no reports of constipation or diarrhea, abdominal pain     Benign essential hypertension  Per history, not currently on antihypertensives.  Denies/no reports of chest pain, palpitations, dizziness or lightheadedness, h/a    Lab Results   Component Value Date    CR 0.71 03/15/2018     Lab Results   Component Value Date    POTASSIUM 4.1 03/15/2018         Last 3 BP's: 135/66, 132/62, 132/64  Pulse Readings from Last 4 Encounters:   08/06/18 78   06/11/18 88   05/21/18 81   04/02/18 74         Localized edema  Edema to BLLE, refuses to wear compression, not compliant with elevating  "feet.  Currently without open areas or pain         ALLERGIES: Macrodantin [nitrofurantoin]  PAST MEDICAL HISTORY:  has a past medical history of Arthritis; Dementia; and Hypothyroidism.  PAST SURGICAL HISTORY:  has a past surgical history that includes orthopedic surgery; hernia repair; and Abdomen surgery.  FAMILY HISTORY: family history is not on file.  SOCIAL HISTORY:  reports that she has never smoked. She does not have any smokeless tobacco history on file. She reports that she drinks alcohol.    MEDICATIONS:  Current Outpatient Prescriptions   Medication Sig Dispense Refill     ACETAMINOPHEN PO Take 500 mg by mouth 2 times daily as needed for pain       ammonium lactate (AMLACTIN) 12 % cream Apply 1 g topically 2 times daily 385 g 11     levothyroxine (SYNTHROID) 112 MCG tablet Take 1 tablet (112 mcg) by mouth daily 90 tablet 11     nystatin (MYCOSTATIN) 492919 UNIT/GM POWD Apply 1 g topically 2 times daily as needed 60 g 11     polyethylene glycol (MIRALAX/GLYCOLAX) Packet Take 17 g by mouth three times a week On M/W/F       polyethylene glycol (MIRALAX/GLYCOLAX) Packet Take 17 g by mouth 2 times daily as needed for constipation 24 packet 11     Medications reviewed:  Medications reconciled to facility chart and changes were made to reflect current medications as identified as above med list. Below are the changes that were made:   Medications stopped since last EPIC medication reconciliation:   There are no discontinued medications.    Medications started since last [facility] medication reconciliation:  No orders of the defined types were placed in this encounter.        Case Management:  I have reviewed the care plan and MDS and do agree with the plan. Patient's desire to return to the community is not assessible due to cognitive impairment.  Information reviewed:  Medications, vital signs, orders, and nursing notes.    ROS:  Limited secondary to cognitive impairment but today pt reports \"feeling great, pushing " "the heck out of 100\"    Exam:  Vitals: /66  Pulse 78  Temp 96.9  F (36.1  C)  Resp 16  Ht 5' (1.524 m)  Wt 172 lb 3.2 oz (78.1 kg)  SpO2 95%  BMI 33.63 kg/m2  BMI= Body mass index is 33.63 kg/(m^2).  GENERAL APPEARANCE:  up in w/c, in no distress  ENT:   moist mucous membranes, poor dentition, missing multiple teeth  RESP:  respiratory effort and palpation of chest normal, lungs clear to auscultation , no respiratory distress  CV:  Palpation and auscultation of heart done , regular rate and rhythm,   2/6 murmur, no rub, or gallop, peripheral edema 2+ in BLLE---baseline  ABDOMEN:  normal bowel sounds, soft, nontender, no hepatosplenomegaly or other masses, obese  M/S:   Gait and station abnormal primarily w/c bound, CMS intact, no erythema/edema of joints, deforming changes of OA in both hands  PSYCH:  oriented to person, place, insight and judgement impaired, memory impaired    Lab/Diagnostic data:     Last Basic Metabolic Panel:  Recent Labs   Lab Test 03/15/18 12/18/17   NA  141  142   POTASSIUM  4.1  4.4   CHLORIDE  108*  110*   FABIO  8.9  9.0   CO2  26  26   BUN  16  16   CR  0.71  0.73   GLC  87  74       TSH   Date Value Ref Range Status   03/15/2018 3.64 0.30 - 5.00 uIU/mL Final   07/13/2017 4.74 0.30 - 5.00 uIU/mL Final         ASSESSMENT/PLAN  (G30.1,  F02.81) Late onset Alzheimer's disease with behavioral disturbance  (primary encounter diagnosis)  Comment: Chronic and progressive with behaviors during cares  Plan: appropriate for LTC, staff assist with cares, redirect behaviors, staff to update with concerns     (M15.0) Primary osteoarthritis involving multiple joints  Comment: Chronic, deformities hands, decreased mobilities  Plan: APAP prn--staff update if having pain   -assist transfers and cares    (E03.9) Acquired hypothyroidism  Comment: Chronic, stable  Plan: continue synthroid  -TSH annually goal 4-5    (K59.00) Constipation, unspecified constipation type  Comment: per history, no " issues reported by staff  Plan: prn bowel meds    (I10) Benign essential hypertension  Comment: chronic, not currently medicated  Plan: continue to monitor, periodic BMP's    (R60.0) Localized edema  Comment: chronic and unchanged, non-compliant with elevation, refusing compression  Plan: monitor skin integrity      Electronically signed by:  JOSE Ennis CNP        Sincerely,        JOSE Ennis CNP

## 2018-08-06 NOTE — PROGRESS NOTES
[facility]    Chief Complaint   Patient presents with     long-term Regulatory       [place] Medical Record Number:  [id number]    HPI:    Alondra Thompson is a 89 year old  (2/27/1919), who is being seen today for a federally mandated E/M visit at [facility].  HPI information obtained from: facility chart records, facility staff and patient report. Today's concerns are:    Late onset Alzheimer's disease with behavioral disturbance  Chronic and progressive.  Behaviors with cares at times, per NH notes can refuse to take bathes, does not like assistance in bathroom.  Her behaviors with staff often include yelling and cursing and occasional combativeness, generally redirectable.  Eating and sleeping well per staff.  No recent weight gain    Primary osteoarthritis involving multiple joints  Per history to multiple joints.  Denies/no reports of pain.  No longer ambulatory.  No recent falls or injuries reported.  Has prn APAP with good effect     Acquired hypothyroidism  Chronic, on synthroid.   Lab Results   Component Value Date    TSH 3.64 03/15/2018       Constipation, unspecified constipation type  Per history, has prn miralex.  Per records having regular BM.  Denies/no reports of constipation or diarrhea, abdominal pain     Benign essential hypertension  Per history, not currently on antihypertensives.  Denies/no reports of chest pain, palpitations, dizziness or lightheadedness, h/a    Lab Results   Component Value Date    CR 0.71 03/15/2018     Lab Results   Component Value Date    POTASSIUM 4.1 03/15/2018         Last 3 BP's: 135/66, 132/62, 132/64  Pulse Readings from Last 4 Encounters:   08/06/18 78   06/11/18 88   05/21/18 81   04/02/18 74         Localized edema  Edema to BLLE, refuses to wear compression, not compliant with elevating feet.  Currently without open areas or pain         ALLERGIES: Macrodantin [nitrofurantoin]  PAST MEDICAL HISTORY:  has  "a past medical history of Arthritis; Dementia; and Hypothyroidism.  PAST SURGICAL HISTORY:  has a past surgical history that includes orthopedic surgery; hernia repair; and Abdomen surgery.  FAMILY HISTORY: family history is not on file.  SOCIAL HISTORY:  reports that she has never smoked. She does not have any smokeless tobacco history on file. She reports that she drinks alcohol.    MEDICATIONS:  Current Outpatient Prescriptions   Medication Sig Dispense Refill     ACETAMINOPHEN PO Take 500 mg by mouth 2 times daily as needed for pain       ammonium lactate (AMLACTIN) 12 % cream Apply 1 g topically 2 times daily 385 g 11     levothyroxine (SYNTHROID) 112 MCG tablet Take 1 tablet (112 mcg) by mouth daily 90 tablet 11     nystatin (MYCOSTATIN) 120691 UNIT/GM POWD Apply 1 g topically 2 times daily as needed 60 g 11     polyethylene glycol (MIRALAX/GLYCOLAX) Packet Take 17 g by mouth three times a week On M/W/F       polyethylene glycol (MIRALAX/GLYCOLAX) Packet Take 17 g by mouth 2 times daily as needed for constipation 24 packet 11     Medications reviewed:  Medications reconciled to facility chart and changes were made to reflect current medications as identified as above med list. Below are the changes that were made:   Medications stopped since last EPIC medication reconciliation:   There are no discontinued medications.    Medications started since last [facility] medication reconciliation:  No orders of the defined types were placed in this encounter.        Case Management:  I have reviewed the care plan and MDS and do agree with the plan. Patient's desire to return to the community is not assessible due to cognitive impairment.  Information reviewed:  Medications, vital signs, orders, and nursing notes.    ROS:  Limited secondary to cognitive impairment but today pt reports \"feeling great, pushing the heck out of 100\"    Exam:  Vitals: /66  Pulse 78  Temp 96.9  F (36.1  C)  Resp 16  Ht 5' (1.524 m)  Wt " 172 lb 3.2 oz (78.1 kg)  SpO2 95%  BMI 33.63 kg/m2  BMI= Body mass index is 33.63 kg/(m^2).  GENERAL APPEARANCE:  up in w/c, in no distress  ENT:   moist mucous membranes, poor dentition, missing multiple teeth  RESP:  respiratory effort and palpation of chest normal, lungs clear to auscultation , no respiratory distress  CV:  Palpation and auscultation of heart done , regular rate and rhythm,   2/6 murmur, no rub, or gallop, peripheral edema 2+ in BLLE---baseline  ABDOMEN:  normal bowel sounds, soft, nontender, no hepatosplenomegaly or other masses, obese  M/S:   Gait and station abnormal primarily w/c bound, CMS intact, no erythema/edema of joints, deforming changes of OA in both hands  PSYCH:  oriented to person, place, insight and judgement impaired, memory impaired    Lab/Diagnostic data:     Last Basic Metabolic Panel:  Recent Labs   Lab Test 03/15/18 12/18/17   NA  141  142   POTASSIUM  4.1  4.4   CHLORIDE  108*  110*   FABIO  8.9  9.0   CO2  26  26   BUN  16  16   CR  0.71  0.73   GLC  87  74       TSH   Date Value Ref Range Status   03/15/2018 3.64 0.30 - 5.00 uIU/mL Final   07/13/2017 4.74 0.30 - 5.00 uIU/mL Final         ASSESSMENT/PLAN  (G30.1,  F02.81) Late onset Alzheimer's disease with behavioral disturbance  (primary encounter diagnosis)  Comment: Chronic and progressive with behaviors during cares  Plan: appropriate for LTC, staff assist with cares, redirect behaviors, staff to update with concerns     (M15.0) Primary osteoarthritis involving multiple joints  Comment: Chronic, deformities hands, decreased mobilities  Plan: APAP prn--staff update if having pain   -assist transfers and cares    (E03.9) Acquired hypothyroidism  Comment: Chronic, stable  Plan: continue synthroid  -TSH annually goal 4-5    (K59.00) Constipation, unspecified constipation type  Comment: per history, no issues reported by staff  Plan: prn bowel meds    (I10) Benign essential hypertension  Comment: chronic, not currently  medicated  Plan: continue to monitor, periodic BMP's    (R60.0) Localized edema  Comment: chronic and unchanged, non-compliant with elevation, refusing compression  Plan: monitor skin integrity      Electronically signed by:  JOSE Ennis CNP

## 2018-09-17 NOTE — LETTER
9/17/2018        RE: Alondra Thompson  [facility]  [address]  [address]  [facility] [address]        [place] [facility]    Chief Complaint   Patient presents with     intermediate Acute       [place] Medical Record Number:  [id number]    HPI:    Alondra Thompson is a 89 year old  (2/27/1919), who is being seen today for an episodic care visit at [facility] .  HPI information obtained from: facility chart records, facility staff, patient report and [facility] chart review.Today's concern is:    Patient seen today as change in healthcare plan, so POC reviewed    Late onset Alzheimer's disease with behavioral disturbance  Chronic and progressive.  Behaviors with cares at times, per NH notes can refuse to take bathes, does not like assistance in bathroom.  Her behaviors with staff often include yelling and cursing and occasional combativeness, generally redirectable.  Eating and sleeping well per staff.  No recent weight loss, and no recent behaviors documented in NH records       Acquired hypothyroidism  Chronic, on synthroid  Lab Results   Component Value Date    TSH 3.64 03/15/2018         Localized edema  Edema to BLLE, refuses to wear compression, not compliant with elevating feet.  Currently without open areas or pain       Last 3 BP's: 126/64, 132/65, 124/70 mmHg  Pulse Readings from Last 4 Encounters:   09/17/18 72   08/06/18 78   06/11/18 88   05/21/18 81     Wt Readings from Last 5 Encounters:   09/17/18 164 lb 9.6 oz (74.7 kg)   08/06/18 172 lb 3.2 oz (78.1 kg)   06/11/18 161 lb 12.8 oz (73.4 kg)   05/21/18 161 lb 12.8 oz (73.4 kg)   04/02/18 160 lb 4.8 oz (72.7 kg)       ALLERGIES: Macrodantin [nitrofurantoin]  Past Medical, Surgical, Family and Social History reviewed and updated in [facility].    Current Outpatient Prescriptions   Medication Sig Dispense Refill     ACETAMINOPHEN PO Take 500 mg by mouth 2 times daily as needed for pain       ammonium  lactate (AMLACTIN) 12 % cream Apply 1 g topically 2 times daily 385 g 11     levothyroxine (SYNTHROID) 112 MCG tablet Take 1 tablet (112 mcg) by mouth daily 90 tablet 11     nystatin (MYCOSTATIN) 756117 UNIT/GM POWD Apply 1 g topically 2 times daily as needed 60 g 11     polyethylene glycol (MIRALAX/GLYCOLAX) Packet Take 17 g by mouth three times a week On M/W/F       polyethylene glycol (MIRALAX/GLYCOLAX) Packet Take 17 g by mouth 2 times daily as needed for constipation 24 packet 11     Medications reviewed:  Medications reconciled to facility chart and changes were made to reflect current medications as identified as above med list. Below are the changes that were made:   Medications stopped since last EPIC medication reconciliation:   There are no discontinued medications.    Medications started since last [facility] medication reconciliation:  No orders of the defined types were placed in this encounter.        REVIEW OF SYSTEMS:  Limited secondary to cognitive impairment but today pt reports no concerns    Physical Exam:  /64  Pulse 72  Temp 97.4  F (36.3  C)  Resp 18  Ht 5' (1.524 m)  Wt 164 lb 9.6 oz (74.7 kg)  SpO2 95%  BMI 32.15 kg/m2  GENERAL APPEARANCE:  up in w/c, in no distress  RESP:  respiratory effort and palpation of chest normal, lungs clear to auscultation , no respiratory distress  M/S:   Gait and station abnormal primarily w/c bound, CMS intact, no erythema/edema of joints, deforming changes of OA in both hands  PSYCH:  oriented to person, place, insight and judgement impaired, memory impaired    Recent Labs:     Last Basic Metabolic Panel:  Recent Labs   Lab Test 03/15/18 12/18/17   NA  141  142   POTASSIUM  4.1  4.4   CHLORIDE  108*  110*   FABIO  8.9  9.0   CO2  26  26   BUN  16  16   CR  0.71  0.73   GLC  87  74       TSH   Date Value Ref Range Status   03/15/2018 3.64 0.30 - 5.00 uIU/mL Final   07/13/2017 4.74 0.30 - 5.00 uIU/mL Final       Assessment/Plan:  (G30.1,  F02.81) Late  onset Alzheimer's disease with behavioral disturbance  (primary encounter diagnosis)  Comment: Chronic and progressive with behaviors during cares  Plan: appropriate for LTC, staff assist with cares, redirect behaviors, staff to update with concerns     (E03.9) Acquired hypothyroidism  Comment: Chronic, stable  Plan: continue synthroid  -TSH annually goal 4-5    (R60.0) Localized edema  Comment: chronic and unchanged, non-compliant with elevation, refusing compression  Plan: monitor skin integrity      The health plan new enrollment has happened. I have reviewed the  MDS, the preventative needs,  and facility care plan. The level of care is appropriate. I have reviewed the code status/advanced directives.     Electronically signed by  JOSE Ennis CNP                      Sincerely,        JOSE Ennis CNP

## 2018-09-17 NOTE — PROGRESS NOTES
[place] [facility]    Chief Complaint   Patient presents with     longterm Acute       [place] Medical Record Number:  [id number]    HPI:    Alondra Thompson is a 89 year old  (2/27/1919), who is being seen today for an episodic care visit at [facility] .  HPI information obtained from: facility chart records, facility staff, patient report and [facility] chart review.Today's concern is:    Patient seen today as change in healthcare plan, so POC reviewed    Late onset Alzheimer's disease with behavioral disturbance  Chronic and progressive.  Behaviors with cares at times, per NH notes can refuse to take bathes, does not like assistance in bathroom.  Her behaviors with staff often include yelling and cursing and occasional combativeness, generally redirectable.  Eating and sleeping well per staff.  No recent weight loss, and no recent behaviors documented in NH records       Acquired hypothyroidism  Chronic, on synthroid  Lab Results   Component Value Date    TSH 3.64 03/15/2018         Localized edema  Edema to BLLE, refuses to wear compression, not compliant with elevating feet.  Currently without open areas or pain       Last 3 BP's: 126/64, 132/65, 124/70 mmHg  Pulse Readings from Last 4 Encounters:   09/17/18 72   08/06/18 78   06/11/18 88   05/21/18 81     Wt Readings from Last 5 Encounters:   09/17/18 164 lb 9.6 oz (74.7 kg)   08/06/18 172 lb 3.2 oz (78.1 kg)   06/11/18 161 lb 12.8 oz (73.4 kg)   05/21/18 161 lb 12.8 oz (73.4 kg)   04/02/18 160 lb 4.8 oz (72.7 kg)       ALLERGIES: Macrodantin [nitrofurantoin]  Past Medical, Surgical, Family and Social History reviewed and updated in [facility].    Current Outpatient Prescriptions   Medication Sig Dispense Refill     ACETAMINOPHEN PO Take 500 mg by mouth 2 times daily as needed for pain       ammonium lactate (AMLACTIN) 12 % cream Apply 1 g topically 2 times daily 385 g 11     levothyroxine (SYNTHROID) 112 MCG tablet  Take 1 tablet (112 mcg) by mouth daily 90 tablet 11     nystatin (MYCOSTATIN) 421907 UNIT/GM POWD Apply 1 g topically 2 times daily as needed 60 g 11     polyethylene glycol (MIRALAX/GLYCOLAX) Packet Take 17 g by mouth three times a week On M/W/F       polyethylene glycol (MIRALAX/GLYCOLAX) Packet Take 17 g by mouth 2 times daily as needed for constipation 24 packet 11     Medications reviewed:  Medications reconciled to facility chart and changes were made to reflect current medications as identified as above med list. Below are the changes that were made:   Medications stopped since last EPIC medication reconciliation:   There are no discontinued medications.    Medications started since last [facility] medication reconciliation:  No orders of the defined types were placed in this encounter.        REVIEW OF SYSTEMS:  Limited secondary to cognitive impairment but today pt reports no concerns    Physical Exam:  /64  Pulse 72  Temp 97.4  F (36.3  C)  Resp 18  Ht 5' (1.524 m)  Wt 164 lb 9.6 oz (74.7 kg)  SpO2 95%  BMI 32.15 kg/m2  GENERAL APPEARANCE:  up in w/c, in no distress  RESP:  respiratory effort and palpation of chest normal, lungs clear to auscultation , no respiratory distress  M/S:   Gait and station abnormal primarily w/c bound, CMS intact, no erythema/edema of joints, deforming changes of OA in both hands  PSYCH:  oriented to person, place, insight and judgement impaired, memory impaired    Recent Labs:     Last Basic Metabolic Panel:  Recent Labs   Lab Test 03/15/18 12/18/17   NA  141  142   POTASSIUM  4.1  4.4   CHLORIDE  108*  110*   FABIO  8.9  9.0   CO2  26  26   BUN  16  16   CR  0.71  0.73   GLC  87  74       TSH   Date Value Ref Range Status   03/15/2018 3.64 0.30 - 5.00 uIU/mL Final   07/13/2017 4.74 0.30 - 5.00 uIU/mL Final       Assessment/Plan:  (G30.1,  F02.81) Late onset Alzheimer's disease with behavioral disturbance  (primary encounter diagnosis)  Comment: Chronic and progressive  with behaviors during cares  Plan: appropriate for LTC, staff assist with cares, redirect behaviors, staff to update with concerns     (E03.9) Acquired hypothyroidism  Comment: Chronic, stable  Plan: continue synthroid  -TSH annually goal 4-5    (R60.0) Localized edema  Comment: chronic and unchanged, non-compliant with elevation, refusing compression  Plan: monitor skin integrity      The health plan new enrollment has happened. I have reviewed the  MDS, the preventative needs,  and facility care plan. The level of care is appropriate. I have reviewed the code status/advanced directives.     Electronically signed by  JOSE Ennis CNP

## 2018-10-08 NOTE — LETTER
"    10/8/2018        RE: Alondra Thompson  [facility]  [address]  [address]  [facility] [address]        Alondra Thompson is a 89 year old female seen October 8, 2018 at [facility] where she has resided for 4 years (admit 3/2014) seen to follow up dementia, OA.   Patient is seen on the unit, up to .  She is chatty and cheerful.   Talks about graduating from [employer].     States she feels well, occ shoulder pain.     She has progressive dementia, low functional status, however does not like assistance with her personal cares.   She can be resistant, yelling and cursing, occ combative with bathing, hygiene.    Has h/o hypothyroidism and is on replacement    Missing most of her teeth but still eats well and weight is stable.     Past Medical History:   Diagnosis Date     Arthritis      Dementia      Hypothyroidism      SH:    Single, previously lived with her sister, house in [place].   Retired from work for the [employer] company      Patient's sister Hansa passed away, and now patient has a legal guardian Angie Hare.       ROS:  Limited, but negative other than above.   Wt Readings from Last 5 Encounters:   10/05/18 164 lb 9.6 oz (74.7 kg)   09/17/18 164 lb 9.6 oz (74.7 kg)   08/06/18 172 lb 3.2 oz (78.1 kg)   06/11/18 161 lb 12.8 oz (73.4 kg)   05/21/18 161 lb 12.8 oz (73.4 kg)        EXAM:  NAD  /76  Pulse 65  Temp 98.1  F (36.7  C)  Resp 18  Ht (!) 5\" (0.127 m)  Wt 164 lb 9.6 oz (74.7 kg)  SpO2 95%  BMI 4,629.06 kg/m2   Missing all her upper teeth.  Neck supple without adenopathy  Lung clear bilaterally with fair air movement  Heart RRR s1s2, 1/6 JULI  Abd obese, soft, NT, no distention, +BS  Ext without edema     Deforming changes of OA in both hands.     Psych: affect okay  Neuro: no focal deficits, confused.     Labs reviewed    IMP/PLAN:  (M15.0) Primary osteoarthritis involving multiple joints    Comment: with deformities, some pain and decreased " mobility     Shoulder pain reported today.    Plan:  prn acetaminophen, local measures.    Considering scheduling acetaminophen given reports of pain and resistance with cares        (E03.9) Hypothyroidism, unspecified type  Comment:    TSH   Date Value Ref Range Status   03/15/2018 3.64 0.30 - 5.00 uIU/mL Final     Plan: yearly TSH       (G30.1,  F02.81) Late onset Alzheimer's disease with behavioral disturbance  Comment: gradual decline, low functional status   Plan: LTC support for assist with meds, meals, activity, mobility and safety.     Staff has been able to use non-pharmacologic approaches for her behaviors, to date.     Will follow     Adrianne Vila MD       Sincerely,        Adrianne Vila MD

## 2018-10-18 PROBLEM — F02.81 ALZHEIMER'S DEMENTIA WITH BEHAVIORAL DISTURBANCE, UNSPECIFIED TIMING OF DEMENTIA ONSET: Status: RESOLVED | Noted: 2017-12-14 | Resolved: 2018-01-01

## 2018-10-19 NOTE — PROGRESS NOTES
"Alondra Thompson is a 89 year old female seen October 8, 2018 at [facility] where she has resided for 4 years (admit 3/2014) seen to follow up dementia, OA.   Patient is seen on the unit, up to .  She is chatty and cheerful.   Talks about graduating from [employer].     States she feels well, occ shoulder pain.     She has progressive dementia, low functional status, however does not like assistance with her personal cares.   She can be resistant, yelling and cursing, occ combative with bathing, hygiene.    Has h/o hypothyroidism and is on replacement    Missing most of her teeth but still eats well and weight is stable.     Past Medical History:   Diagnosis Date     Arthritis      Dementia      Hypothyroidism      SH:    Single, previously lived with her sister, house in [place].   Retired from work for the [employer]      Patient's sister Hansa passed away, and now patient has a legal guardian Angie Hare.       ROS:  Limited, but negative other than above.   Wt Readings from Last 5 Encounters:   10/05/18 164 lb 9.6 oz (74.7 kg)   09/17/18 164 lb 9.6 oz (74.7 kg)   08/06/18 172 lb 3.2 oz (78.1 kg)   06/11/18 161 lb 12.8 oz (73.4 kg)   05/21/18 161 lb 12.8 oz (73.4 kg)        EXAM:  NAD  /76  Pulse 65  Temp 98.1  F (36.7  C)  Resp 18  Ht (!) 5\" (0.127 m)  Wt 164 lb 9.6 oz (74.7 kg)  SpO2 95%  BMI 4,629.06 kg/m2   Missing all her upper teeth.  Neck supple without adenopathy  Lung clear bilaterally with fair air movement  Heart RRR s1s2, 1/6 JULI  Abd obese, soft, NT, no distention, +BS  Ext without edema     Deforming changes of OA in both hands.     Psych: affect okay  Neuro: no focal deficits, confused.     Labs reviewed    IMP/PLAN:  (M15.0) Primary osteoarthritis involving multiple joints    Comment: with deformities, some pain and decreased mobility     Shoulder pain reported today.    Plan:  prn acetaminophen, local measures.    Considering scheduling " acetaminophen given reports of pain and resistance with cares        (E03.9) Hypothyroidism, unspecified type  Comment:    TSH   Date Value Ref Range Status   03/15/2018 3.64 0.30 - 5.00 uIU/mL Final     Plan: yearly TSH       (G30.1,  F02.81) Late onset Alzheimer's disease with behavioral disturbance  Comment: gradual decline, low functional status   Plan: LTC support for assist with meds, meals, activity, mobility and safety.     Staff has been able to use non-pharmacologic approaches for her behaviors, to date.     Will follow     Adrianne Vila MD

## 2018-11-02 NOTE — PROGRESS NOTES
This patient's medication list and chart were reviewed as part of the service provided by [facility].    Assessment/Recommendations:  Agree with plan to consider scheduling Tylenol; pain may contribute to yelling out at times, resistant to cares.  May consider beginning with 500mg bid.    Elizabeth Moreira, Pharm.D.,[facility]  Board Certified Geriatric Pharmacist  Medication Therapy Management Pharmacist  [phone]

## 2018-12-03 NOTE — LETTER
12/3/2018        RE: Alondra Thompson  [facility]  [address]  [address]  [facility] [address]          [facility]    Chief Complaint   Patient presents with     skilled nursing Regulatory       [facility] Record Number:  [id number]  Place of Service where encounter took place:  [facility] ([facility] [[id number]]    HPI:    Alondra Thompson is a 89 year old  (2/27/1919), who is being seen today for a federally mandated E/M visit.  HPI information obtained from: facility chart records, facility staff, patient report and [facility] chart review. Today's concerns are:    Late onset Alzheimer's disease with behavioral disturbance  Chronic and progressive.  Behaviors with cares at times, per NH notes can refuse to take bathes, does not like assistance in bathroom, recently with orders for PROM per therapy and patient becomes very combative with this.  Her behaviors with staff often include yelling and cursing and occasional combativeness, generally redirectable.  Eating and sleeping well per staff.  No recent weight loss      Wt Readings from Last 5 Encounters:   12/03/18 160 lb 9.6 oz (72.8 kg)   10/05/18 164 lb 9.6 oz (74.7 kg)   09/17/18 164 lb 9.6 oz (74.7 kg)   08/06/18 172 lb 3.2 oz (78.1 kg)   06/11/18 161 lb 12.8 oz (73.4 kg)       Primary osteoarthritis involving multiple joints  Per history to multiple joints.  Denies/no reports of pain.  No longer ambulatory.  Had fall in November, therapy eval ordered and patient worked with therapy, no falls reports since.  As above, combative with cares, denies pain, but resistant with being moved and with cares.  Not currently on scheduled APAP    Acquired hypothyroidism  Chronic, on synthroid.    Lab Results   Component Value Date    TSH 3.64 03/15/2018         Localized edema  Edema to BLLE, refuses to wear compression, not compliant with elevating feet.  Currently without open areas or pain    Benign  essential hypertension  Per history, not currently on antihypertensives.  Denies/no reports of chest pain, palpitations, dizziness or lightheadedness, h/a      Last 3 BP's: 113/67, 116/71, 124/58 mmHg    Lab Results   Component Value Date    CR 0.71 03/15/2018     Lab Results   Component Value Date    POTASSIUM 4.1 03/15/2018         Constipation, unspecified constipation type  Per history, has prn miralex.  Per records having regular BM.  Denies/no reports of constipation or diarrhea, abdominal pain        Pulse Readings from Last 4 Encounters:   12/03/18 78   10/05/18 65   09/17/18 72   08/06/18 78       ALLERGIES: Macrodantin [nitrofurantoin]  PAST MEDICAL HISTORY:  has a past medical history of Arthritis; Dementia; and Hypothyroidism.  PAST SURGICAL HISTORY:  has a past surgical history that includes orthopedic surgery; hernia repair; and Abdomen surgery.  FAMILY HISTORY: family history is not on file.  SOCIAL HISTORY:  reports that she has never smoked. She does not have any smokeless tobacco history on file. She reports that she drinks alcohol.    MEDICATIONS:  Current Outpatient Prescriptions   Medication Sig Dispense Refill     ACETAMINOPHEN PO Take 500 mg by mouth 2 times daily as needed for pain       ammonium lactate (AMLACTIN) 12 % cream Apply 1 g topically 2 times daily 385 g 11     levothyroxine (SYNTHROID) 112 MCG tablet Take 1 tablet (112 mcg) by mouth daily 90 tablet 11     nystatin (MYCOSTATIN) 584357 UNIT/GM POWD Apply 1 g topically 2 times daily as needed 60 g 11     polyethylene glycol (MIRALAX/GLYCOLAX) Packet Take 17 g by mouth three times a week On M/W/F       polyethylene glycol (MIRALAX/GLYCOLAX) Packet Take 17 g by mouth 2 times daily as needed for constipation 24 packet 11     Medications reviewed:  Medications reconciled to facility chart and changes were made to reflect current medications as identified as above med list. Below are the changes that were made:   Medications stopped since  "last EPIC medication reconciliation:   There are no discontinued medications.    Medications started since last [facility] medication reconciliation:  No orders of the defined types were placed in this encounter.        Case Management:  I have reviewed the care plan and MDS and do agree with the plan. Patient's desire to return to the community is not assessible due to cognitive impairment.  Information reviewed:  Medications, vital signs, orders, and nursing notes.    ROS:  Limited secondary to cognitive impairment but today pt reports \"pushing the heck out of 100\"    Exam:  Vitals: /67  Pulse 78  Temp 97.8  F (36.6  C)  Resp 16  Ht 5' (1.524 m)  Wt 160 lb 9.6 oz (72.8 kg)  SpO2 94%  BMI 31.37 kg/m2  BMI= Body mass index is 31.37 kg/(m^2).  GENERAL APPEARANCE:  up in w/c, in no distress  ENT:   moist mucous membranes, poor dentition, missing multiple teeth  RESP:  respiratory effort and palpation of chest normal, lungs clear to auscultation , no respiratory distress  CV:  Palpation and auscultation of heart done , regular rate and rhythm,   2/6 murmur, no rub, or gallop, peripheral edema 2+ in BLLE, particularly to ankles and feet---baseline  ABDOMEN:  normal bowel sounds, soft, nontender, no hepatosplenomegaly or other masses, obese  M/S:   Gait and station abnormal primarily w/c bound, CMS intact, no erythema/edema of joints, deforming changes of OA in both hands  PSYCH:  oriented to person, place, insight and judgement impaired, memory impaired, talkative and pleasant       Lab/Diagnostic data:     Last Basic Metabolic Panel:  Recent Labs   Lab Test 03/15/18 12/18/17   NA  141  142   POTASSIUM  4.1  4.4   CHLORIDE  108*  110*   FABIO  8.9  9.0   CO2  26  26   BUN  16  16   CR  0.71  0.73   GLC  87  74       TSH   Date Value Ref Range Status   03/15/2018 3.64 0.30 - 5.00 uIU/mL Final   07/13/2017 4.74 0.30 - 5.00 uIU/mL Final       ASSESSMENT/PLAN  (G30.1,  F02.81) Late onset Alzheimer's disease with " behavioral disturbance  (primary encounter diagnosis)  Comment: Chronic and progressive with behaviors during cares  Plan: appropriate for LTC, staff assist with cares, redirect behaviors, staff to update with concerns     (M15.0) Primary osteoarthritis involving multiple joints  Comment: Chronic, deformities hands, decreased mobilities, fall in November  Plan: ? If pain contributing to behaviors, particularly with pain, schedule APAP 500mg po BID  -completed work with therapy  -assist transfers and cares    (E03.9) Acquired hypothyroidism  Comment: Chronic, stable  Plan: continue synthroid  -TSH annually goal 4-5    (R60.0) Localized edema  Comment: chronic and unchanged, non-compliant with elevation, refusing compression  Plan: monitor skin integrity    (I10) Benign essential hypertension  Comment: chronic, not currently medicated, goal SBP <150 given age and goals of care, control adequate  Plan: continue to monitor  -BMP next lab day       (K59.00) Constipation, unspecified constipation type  Comment: per history, no issues reported by staff  Plan: clark OLIVER, W, F      Electronically signed by:  JOSE Ennis CNP        Sincerely,        JOSE Ennis CNP

## 2018-12-03 NOTE — PROGRESS NOTES
[facility]    Chief Complaint   Patient presents with     long-term Regulatory       [facility] Record Number:  [id number]  Place of Service where encounter took place:  [facility] ([facility] [[id number]]    HPI:    Alondra Thompson is a 89 year old  (2/27/1919), who is being seen today for a federally mandated E/M visit.  HPI information obtained from: facility chart records, facility staff, patient report and [facility] chart review. Today's concerns are:    Late onset Alzheimer's disease with behavioral disturbance  Chronic and progressive.  Behaviors with cares at times, per NH notes can refuse to take bathes, does not like assistance in bathroom, recently with orders for PROM per therapy and patient becomes very combative with this.  Her behaviors with staff often include yelling and cursing and occasional combativeness, generally redirectable.  Eating and sleeping well per staff.  No recent weight loss      Wt Readings from Last 5 Encounters:   12/03/18 160 lb 9.6 oz (72.8 kg)   10/05/18 164 lb 9.6 oz (74.7 kg)   09/17/18 164 lb 9.6 oz (74.7 kg)   08/06/18 172 lb 3.2 oz (78.1 kg)   06/11/18 161 lb 12.8 oz (73.4 kg)       Primary osteoarthritis involving multiple joints  Per history to multiple joints.  Denies/no reports of pain.  No longer ambulatory.  Had fall in November, therapy eval ordered and patient worked with therapy, no falls reports since.  As above, combative with cares, denies pain, but resistant with being moved and with cares.  Not currently on scheduled APAP    Acquired hypothyroidism  Chronic, on synthroid.    Lab Results   Component Value Date    TSH 3.64 03/15/2018         Localized edema  Edema to BLLE, refuses to wear compression, not compliant with elevating feet.  Currently without open areas or pain    Benign essential hypertension  Per history, not currently on antihypertensives.  Denies/no reports of chest pain, palpitations,  dizziness or lightheadedness, h/a      Last 3 BP's: 113/67, 116/71, 124/58 mmHg    Lab Results   Component Value Date    CR 0.71 03/15/2018     Lab Results   Component Value Date    POTASSIUM 4.1 03/15/2018         Constipation, unspecified constipation type  Per history, has prn miralex.  Per records having regular BM.  Denies/no reports of constipation or diarrhea, abdominal pain        Pulse Readings from Last 4 Encounters:   12/03/18 78   10/05/18 65   09/17/18 72   08/06/18 78       ALLERGIES: Macrodantin [nitrofurantoin]  PAST MEDICAL HISTORY:  has a past medical history of Arthritis; Dementia; and Hypothyroidism.  PAST SURGICAL HISTORY:  has a past surgical history that includes orthopedic surgery; hernia repair; and Abdomen surgery.  FAMILY HISTORY: family history is not on file.  SOCIAL HISTORY:  reports that she has never smoked. She does not have any smokeless tobacco history on file. She reports that she drinks alcohol.    MEDICATIONS:  Current Outpatient Prescriptions   Medication Sig Dispense Refill     ACETAMINOPHEN PO Take 500 mg by mouth 2 times daily as needed for pain       ammonium lactate (AMLACTIN) 12 % cream Apply 1 g topically 2 times daily 385 g 11     levothyroxine (SYNTHROID) 112 MCG tablet Take 1 tablet (112 mcg) by mouth daily 90 tablet 11     nystatin (MYCOSTATIN) 460019 UNIT/GM POWD Apply 1 g topically 2 times daily as needed 60 g 11     polyethylene glycol (MIRALAX/GLYCOLAX) Packet Take 17 g by mouth three times a week On M/W/F       polyethylene glycol (MIRALAX/GLYCOLAX) Packet Take 17 g by mouth 2 times daily as needed for constipation 24 packet 11     Medications reviewed:  Medications reconciled to facility chart and changes were made to reflect current medications as identified as above med list. Below are the changes that were made:   Medications stopped since last EPIC medication reconciliation:   There are no discontinued medications.    Medications started since last EPIC  "medication reconciliation:  No orders of the defined types were placed in this encounter.        Case Management:  I have reviewed the care plan and MDS and do agree with the plan. Patient's desire to return to the community is not assessible due to cognitive impairment.  Information reviewed:  Medications, vital signs, orders, and nursing notes.    ROS:  Limited secondary to cognitive impairment but today pt reports \"pushing the heck out of 100\"    Exam:  Vitals: /67  Pulse 78  Temp 97.8  F (36.6  C)  Resp 16  Ht 5' (1.524 m)  Wt 160 lb 9.6 oz (72.8 kg)  SpO2 94%  BMI 31.37 kg/m2  BMI= Body mass index is 31.37 kg/(m^2).  GENERAL APPEARANCE:  up in w/c, in no distress  ENT:   moist mucous membranes, poor dentition, missing multiple teeth  RESP:  respiratory effort and palpation of chest normal, lungs clear to auscultation , no respiratory distress  CV:  Palpation and auscultation of heart done , regular rate and rhythm,   2/6 murmur, no rub, or gallop, peripheral edema 2+ in BLLE, particularly to ankles and feet---baseline  ABDOMEN:  normal bowel sounds, soft, nontender, no hepatosplenomegaly or other masses, obese  M/S:   Gait and station abnormal primarily w/c bound, CMS intact, no erythema/edema of joints, deforming changes of OA in both hands  PSYCH:  oriented to person, place, insight and judgement impaired, memory impaired, talkative and pleasant       Lab/Diagnostic data:     Last Basic Metabolic Panel:  Recent Labs   Lab Test 03/15/18 12/18/17   NA  141  142   POTASSIUM  4.1  4.4   CHLORIDE  108*  110*   FABIO  8.9  9.0   CO2  26  26   BUN  16  16   CR  0.71  0.73   GLC  87  74       TSH   Date Value Ref Range Status   03/15/2018 3.64 0.30 - 5.00 uIU/mL Final   07/13/2017 4.74 0.30 - 5.00 uIU/mL Final       ASSESSMENT/PLAN  (G30.1,  F02.81) Late onset Alzheimer's disease with behavioral disturbance  (primary encounter diagnosis)  Comment: Chronic and progressive with behaviors during cares  Plan: " appropriate for LTC, staff assist with cares, redirect behaviors, staff to update with concerns     (M15.0) Primary osteoarthritis involving multiple joints  Comment: Chronic, deformities hands, decreased mobilities, fall in November  Plan: ? If pain contributing to behaviors, particularly with pain, schedule APAP 500mg po BID  -completed work with therapy  -assist transfers and cares    (E03.9) Acquired hypothyroidism  Comment: Chronic, stable  Plan: continue synthroid  -TSH annually goal 4-5    (R60.0) Localized edema  Comment: chronic and unchanged, non-compliant with elevation, refusing compression  Plan: monitor skin integrity    (I10) Benign essential hypertension  Comment: chronic, not currently medicated, goal SBP <150 given age and goals of care, control adequate  Plan: continue to monitor  -BMP next lab day       (K59.00) Constipation, unspecified constipation type  Comment: per history, no issues reported by staff  Plan: CHRISTINA Meyer, F      Electronically signed by:  JOSE Ennis CNP

## 2019-01-01 ENCOUNTER — RECORDS - HEALTHEAST (OUTPATIENT)
Dept: LAB | Facility: CLINIC | Age: 84
End: 2019-01-01

## 2019-01-01 ENCOUNTER — NURSING HOME VISIT (OUTPATIENT)
Dept: GERIATRICS | Facility: CLINIC | Age: 84
End: 2019-01-01
Payer: MEDICARE

## 2019-01-01 ENCOUNTER — TELEPHONE (OUTPATIENT)
Dept: GERIATRICS | Facility: CLINIC | Age: 84
End: 2019-01-01

## 2019-01-01 VITALS
SYSTOLIC BLOOD PRESSURE: 130 MMHG | OXYGEN SATURATION: 95 % | DIASTOLIC BLOOD PRESSURE: 76 MMHG | WEIGHT: 163.8 LBS | TEMPERATURE: 97.9 F | HEIGHT: 60 IN | HEART RATE: 75 BPM | BODY MASS INDEX: 32.16 KG/M2 | RESPIRATION RATE: 15 BRPM

## 2019-01-01 VITALS
OXYGEN SATURATION: 95 % | HEART RATE: 80 BPM | SYSTOLIC BLOOD PRESSURE: 120 MMHG | BODY MASS INDEX: 32.2 KG/M2 | HEIGHT: 60 IN | DIASTOLIC BLOOD PRESSURE: 68 MMHG | TEMPERATURE: 97 F | WEIGHT: 164 LBS | RESPIRATION RATE: 18 BRPM

## 2019-01-01 VITALS
HEART RATE: 66 BPM | RESPIRATION RATE: 16 BRPM | SYSTOLIC BLOOD PRESSURE: 112 MMHG | BODY MASS INDEX: 31.88 KG/M2 | HEIGHT: 60 IN | DIASTOLIC BLOOD PRESSURE: 65 MMHG | OXYGEN SATURATION: 95 % | TEMPERATURE: 97.9 F | WEIGHT: 162.4 LBS

## 2019-01-01 DIAGNOSIS — E03.9 HYPOTHYROIDISM, UNSPECIFIED TYPE: ICD-10-CM

## 2019-01-01 DIAGNOSIS — G30.1 LATE ONSET ALZHEIMER'S DISEASE WITH BEHAVIORAL DISTURBANCE (H): Primary | ICD-10-CM

## 2019-01-01 DIAGNOSIS — F02.818 LATE ONSET ALZHEIMER'S DISEASE WITH BEHAVIORAL DISTURBANCE (H): Primary | ICD-10-CM

## 2019-01-01 DIAGNOSIS — M15.0 PRIMARY OSTEOARTHRITIS INVOLVING MULTIPLE JOINTS: ICD-10-CM

## 2019-01-01 DIAGNOSIS — G30.1 LATE ONSET ALZHEIMER'S DISEASE WITH BEHAVIORAL DISTURBANCE (H): ICD-10-CM

## 2019-01-01 DIAGNOSIS — E03.9 ACQUIRED HYPOTHYROIDISM: ICD-10-CM

## 2019-01-01 DIAGNOSIS — I10 BENIGN ESSENTIAL HYPERTENSION: ICD-10-CM

## 2019-01-01 DIAGNOSIS — R60.0 LOCALIZED EDEMA: ICD-10-CM

## 2019-01-01 DIAGNOSIS — H02.106 ECTROPION DUE TO LAXITY OF EYELID, LEFT: ICD-10-CM

## 2019-01-01 DIAGNOSIS — M15.0 PRIMARY OSTEOARTHRITIS INVOLVING MULTIPLE JOINTS: Primary | ICD-10-CM

## 2019-01-01 DIAGNOSIS — F02.818 LATE ONSET ALZHEIMER'S DISEASE WITH BEHAVIORAL DISTURBANCE (H): ICD-10-CM

## 2019-01-01 LAB
ALBUMIN UR-MCNC: ABNORMAL MG/DL
ANION GAP SERPL CALCULATED.3IONS-SCNC: 25 MMOL/L (ref 5–18)
APPEARANCE UR: ABNORMAL
BACTERIA #/AREA URNS HPF: ABNORMAL HPF
BACTERIA SPEC CULT: NO GROWTH
BASOPHILS # BLD AUTO: 0.1 THOU/UL (ref 0–0.2)
BASOPHILS NFR BLD AUTO: 0 % (ref 0–2)
BILIRUB UR QL STRIP: ABNORMAL
BUN SERPL-MCNC: 67 MG/DL (ref 8–28)
CALCIUM SERPL-MCNC: 8.2 MG/DL (ref 8.5–10.5)
CHLORIDE BLD-SCNC: 105 MMOL/L (ref 98–107)
CO2 SERPL-SCNC: 10 MMOL/L (ref 22–31)
COLOR UR AUTO: ABNORMAL
CREAT SERPL-MCNC: 4.31 MG/DL (ref 0.6–1.1)
EOSINOPHIL # BLD AUTO: 0 THOU/UL (ref 0–0.4)
EOSINOPHIL NFR BLD AUTO: 0 % (ref 0–6)
ERYTHROCYTE [DISTWIDTH] IN BLOOD BY AUTOMATED COUNT: 14.4 % (ref 11–14.5)
GFR SERPL CREATININE-BSD FRML MDRD: 9 ML/MIN/1.73M2
GLUCOSE BLD-MCNC: 62 MG/DL (ref 70–125)
GLUCOSE UR STRIP-MCNC: ABNORMAL MG/DL
HCT VFR BLD AUTO: 49.3 % (ref 35–47)
HGB BLD-MCNC: 15.4 G/DL (ref 12–16)
HGB UR QL STRIP: ABNORMAL
KETONES UR STRIP-MCNC: NEGATIVE MG/DL
LEUKOCYTE ESTERASE UR QL STRIP: ABNORMAL
LYMPHOCYTES # BLD AUTO: 1 THOU/UL (ref 0.8–4.4)
LYMPHOCYTES NFR BLD AUTO: 7 % (ref 20–40)
MCH RBC QN AUTO: 29.4 PG (ref 27–34)
MCHC RBC AUTO-ENTMCNC: 31.2 G/DL (ref 32–36)
MCV RBC AUTO: 94 FL (ref 80–100)
MONOCYTES # BLD AUTO: 0.8 THOU/UL (ref 0–0.9)
MONOCYTES NFR BLD AUTO: 6 % (ref 2–10)
MUCOUS THREADS #/AREA URNS LPF: ABNORMAL LPF
NEUTROPHILS # BLD AUTO: 12.3 THOU/UL (ref 2–7.7)
NEUTROPHILS NFR BLD AUTO: 87 % (ref 50–70)
NITRATE UR QL: NEGATIVE
PH UR STRIP: 5 [PH] (ref 4.5–8)
PLATELET # BLD AUTO: 168 THOU/UL (ref 140–440)
PMV BLD AUTO: 11.5 FL (ref 8.5–12.5)
POTASSIUM BLD-SCNC: 4.9 MMOL/L (ref 3.5–5)
RBC # BLD AUTO: 5.23 MILL/UL (ref 3.8–5.4)
RBC #/AREA URNS AUTO: ABNORMAL HPF
SODIUM SERPL-SCNC: 140 MMOL/L (ref 136–145)
SP GR UR STRIP: 1.03 (ref 1–1.03)
SQUAMOUS #/AREA URNS AUTO: ABNORMAL LPF
UROBILINOGEN UR STRIP-ACNC: ABNORMAL
WBC #/AREA URNS AUTO: ABNORMAL HPF
WBC CLUMPS #/AREA URNS HPF: PRESENT /[HPF]
WBC: 14.2 THOU/UL (ref 4–11)

## 2019-01-01 PROCEDURE — 99308 SBSQ NF CARE LOW MDM 20: CPT | Performed by: INTERNAL MEDICINE

## 2019-01-01 PROCEDURE — 99309 SBSQ NF CARE MODERATE MDM 30: CPT | Performed by: NURSE PRACTITIONER

## 2019-01-01 RX ORDER — AMMONIUM LACTATE 12 G/100G
LOTION TOPICAL 3 TIMES DAILY PRN
COMMUNITY

## 2019-01-01 ASSESSMENT — MIFFLIN-ST. JEOR
SCORE: 1028.14
SCORE: 1034.49
SCORE: 1040.4

## 2019-02-07 NOTE — LETTER
"    2/7/2019        RE: Alondra Thompson  [facility]  [address]  [address]  [facility] [address]        Alondra Thompson is a 89 year old female seen October 8, 2018 at [facility] where she has resided for 5 years (admit 3/2014) seen to follow up dementia, OA.     Patient is seen on the unit, up to .  She is chatty and cheerful.    States she feels well, \"I'm an old lady!\"   Very proud that she will be turning 89 years old later this month.      Patient is relatively healthy, but has late onset dementia with some progression.   Nursing staff reports she can be resistant to cares, yelling and cursing, occ combative with bathing, hygiene.    Has h/o hypothyroidism and is on replacement    Missing most of her teeth but still eats well and weight is stable.     Past Medical History:   Diagnosis Date     Osteoarthritis      Dementia, late onset      Hypothyroidism      SH:    Single, previously lived with her sister, house in [place].   Retired from work for the [employer]      Patient's sister Hansa passed away, and now patient has a legal guardian Angie Hare.       ROS:  Limited, but negative other than above.   Wt Readings from Last 5 Encounters:   02/07/19 74.4 kg (164 lb)   12/03/18 72.8 kg (160 lb 9.6 oz)   10/05/18 74.7 kg (164 lb 9.6 oz)   09/17/18 74.7 kg (164 lb 9.6 oz)   08/06/18 78.1 kg (172 lb 3.2 oz)        EXAM:  NAD  /68   Pulse 80   Temp 97  F (36.1  C)   Resp 18   Ht 1.524 m (5')   Wt 74.4 kg (164 lb)   SpO2 95%   BMI 32.03 kg/m      Missing all her upper teeth.  Neck supple without adenopathy  Lung clear bilaterally with fair air movement  Heart RRR s1s2, 1/6 JULI  Abd obese, soft, NT, no distention, +BS  Ext without edema     Deforming changes of OA in both hands.     Psych: affect okay, smiling almost continuously      Neuro: no focal deficits, confused.     Last Comprehensive Metabolic Panel:  Sodium   Date Value Ref Range Status   12/10/2018 " 141 136 - 145 mmol/L Final     Potassium   Date Value Ref Range Status   12/10/2018 4.1 3.5 - 5.0 mmol/L Final     Chloride   Date Value Ref Range Status   12/10/2018 109 (H) 98 - 107 mmol/L Final     Carbon Dioxide   Date Value Ref Range Status   12/10/2018 24 22 - 31 mmol/L Final     Anion Gap   Date Value Ref Range Status   12/10/2018 8 5 - 18 mmol/L Final     Glucose   Date Value Ref Range Status   12/10/2018 80 70 - 125 mg/dL Final     Urea Nitrogen   Date Value Ref Range Status   12/10/2018 15 8 - 28 mg/dL Final     Creatinine   Date Value Ref Range Status   12/10/2018 0.69 0.60 - 1.10 mg/dL Final     GFR Estimate   Date Value Ref Range Status   12/10/2018 >60 >60 ml/min/1.73m2 Final     Calcium   Date Value Ref Range Status   12/10/2018 8.8 8.5 - 10.5 mg/dL Final       IMP/PLAN:  (M15.0) Primary osteoarthritis involving multiple joints    Comment: with deformities, some pain and decreased mobility       Plan:  Local measures, scheduled and prn acetaminophen.         (E03.9) Hypothyroidism, unspecified type  Comment:    TSH   Date Value Ref Range Status   12/10/2018 2.87 0.30 - 5.00 uIU/mL Final     Plan: yearly TSH       (G30.1,  F02.81) Late onset Alzheimer's disease with behavioral disturbance  Comment: gradual decline, low functional status   Plan: LTC support for assist with meds, meals, activity, mobility and safety.     Staff has been able to use non-pharmacologic approaches for her behaviors, to date.     Will follow     Adrianne Vila MD       Sincerely,        Adrianne Vila MD

## 2019-02-10 NOTE — PROGRESS NOTES
"Alondra Thompsno is a 89 year old female seen October 8, 2018 at [facility] where she has resided for 5 years (admit 3/2014) seen to follow up dementia, OA.     Patient is seen on the unit, up to .  She is chatty and cheerful.    States she feels well, \"I'm an old lady!\"   Very proud that she will be turning 89 years old later this month.      Patient is relatively healthy, but has late onset dementia with some progression.   Nursing staff reports she can be resistant to cares, yelling and cursing, occ combative with bathing, hygiene.    Has h/o hypothyroidism and is on replacement    Missing most of her teeth but still eats well and weight is stable.     Past Medical History:   Diagnosis Date     Osteoarthritis      Dementia, late onset      Hypothyroidism      SH:    Single, previously lived with her sister, house in [place].   Retired from work for the [employer] company      Patient's sister Hansa passed away, and now patient has a legal guardian Angie Hare.       ROS:  Limited, but negative other than above.   Wt Readings from Last 5 Encounters:   02/07/19 74.4 kg (164 lb)   12/03/18 72.8 kg (160 lb 9.6 oz)   10/05/18 74.7 kg (164 lb 9.6 oz)   09/17/18 74.7 kg (164 lb 9.6 oz)   08/06/18 78.1 kg (172 lb 3.2 oz)        EXAM:  NAD  /68   Pulse 80   Temp 97  F (36.1  C)   Resp 18   Ht 1.524 m (5')   Wt 74.4 kg (164 lb)   SpO2 95%   BMI 32.03 kg/m     Missing all her upper teeth.  Neck supple without adenopathy  Lung clear bilaterally with fair air movement  Heart RRR s1s2, 1/6 JULI  Abd obese, soft, NT, no distention, +BS  Ext without edema     Deforming changes of OA in both hands.     Psych: affect okay, smiling almost continuously      Neuro: no focal deficits, confused.     Last Comprehensive Metabolic Panel:  Sodium   Date Value Ref Range Status   12/10/2018 141 136 - 145 mmol/L Final     Potassium   Date Value Ref Range Status   12/10/2018 4.1 3.5 - 5.0 mmol/L Final "     Chloride   Date Value Ref Range Status   12/10/2018 109 (H) 98 - 107 mmol/L Final     Carbon Dioxide   Date Value Ref Range Status   12/10/2018 24 22 - 31 mmol/L Final     Anion Gap   Date Value Ref Range Status   12/10/2018 8 5 - 18 mmol/L Final     Glucose   Date Value Ref Range Status   12/10/2018 80 70 - 125 mg/dL Final     Urea Nitrogen   Date Value Ref Range Status   12/10/2018 15 8 - 28 mg/dL Final     Creatinine   Date Value Ref Range Status   12/10/2018 0.69 0.60 - 1.10 mg/dL Final     GFR Estimate   Date Value Ref Range Status   12/10/2018 >60 >60 ml/min/1.73m2 Final     Calcium   Date Value Ref Range Status   12/10/2018 8.8 8.5 - 10.5 mg/dL Final       IMP/PLAN:  (M15.0) Primary osteoarthritis involving multiple joints    Comment: with deformities, some pain and decreased mobility       Plan:  Local measures, scheduled and prn acetaminophen.         (E03.9) Hypothyroidism, unspecified type  Comment:    TSH   Date Value Ref Range Status   12/10/2018 2.87 0.30 - 5.00 uIU/mL Final     Plan: yearly TSH       (G30.1,  F02.81) Late onset Alzheimer's disease with behavioral disturbance  Comment: gradual decline, low functional status   Plan: LTC support for assist with meds, meals, activity, mobility and safety.     Staff has been able to use non-pharmacologic approaches for her behaviors, to date.     Will follow     Adrianne Vila MD

## 2019-04-04 NOTE — LETTER
4/4/2019        RE: Alondra Thompson  [facility]  [address]  [address]  [facility] [address]        [place] [facility]  Chief Complaint   Patient presents with     USP Regulatory     [place] Medical Record Number:  [id number]  Place of Service where encounter took place:  [facility] ([facility] [[id number]]      HPI:    Alondra Thompson  is 89 year old (2/27/1919), who is being seen today for a federally mandated E/M visit.  HPI information obtained from: facility chart records, facility staff, patient report and [facility] chart review. Today's concerns are:  Late onset Alzheimer's disease with behavioral disturbance  Chronic and progressive.  Behaviors with cares at times, per NH staff, can refuse to take bathes, does not like assistance in bathroom.  Review of records in NH, last recording of behaviors from end of March.  Behaviors reported are infrequent and generally with cares.  Her behaviors with staff often include yelling and cursing and occasional combativeness, biting, generally redirectable.  Eating and sleeping well per staff.  No recent weight loss    Wt Readings from Last 4 Encounters:   04/04/19 73.7 kg (162 lb 6.4 oz)   02/07/19 74.4 kg (164 lb)   12/03/18 72.8 kg (160 lb 9.6 oz)   10/05/18 74.7 kg (164 lb 9.6 oz)       Acquired hypothyroidism  Per history, on synthroid    Lab Results   Component Value Date    TSH 2.87 12/10/2018         Localized edema  Edema to BLLE, refuses to wear compression, not compliant with elevating feet.  Currently without open areas or pain    Benign essential hypertension  Per history, not currently on antihypertensives.  Denies/no reports of chest pain, palpitations, dizziness or lightheadedness, h/a.  Review of [facility] BP, adequate control as noted below    BP: 112-134/65-69 mmHg  Lab Results   Component Value Date    CR 0.69 12/10/2018     Lab Results   Component Value Date    POTASSIUM 4.1 12/10/2018  "        Ectropion of left lower eyelid  Ongoing, notef to left eye, but no drainage, purulence noted, PERRL, EOM intact.   Not bothersome to patient.  Have attempted interventions such as artificial tears in the past with poor effect and patient refused. Remains unchanged        ALLERGIES:Macrodantin [nitrofurantoin]  PAST MEDICAL HISTORY:   has a past medical history of Arthritis, Dementia, and Hypothyroidism.  PAST SURGICAL HISTORY:   has a past surgical history that includes orthopedic surgery; hernia repair; and Abdomen surgery.  FAMILY HISTORY: family history is not on file.  SOCIAL HISTORY:  reports that  has never smoked. She does not have any smokeless tobacco history on file. She reports that she drinks alcohol.    MEDICATIONS:  Current Outpatient Medications   Medication Sig Dispense Refill     ACETAMINOPHEN PO Take 500 mg by mouth 2 times daily AND BID PRN       ammonium lactate (AMLACTIN) 12 % cream Apply 1 g topically 2 times daily 385 g 11     levothyroxine (SYNTHROID) 112 MCG tablet Take 1 tablet (112 mcg) by mouth daily 90 tablet 11     nystatin (MYCOSTATIN) 233842 UNIT/GM POWD Apply 1 g topically 2 times daily as needed 60 g 11     polyethylene glycol (MIRALAX/GLYCOLAX) Packet Take 17 g by mouth three times a week On M/W/F       polyethylene glycol (MIRALAX/GLYCOLAX) Packet Take 17 g by mouth 2 times daily as needed for constipation 24 packet 11       Case Management:  I have reviewed the care plan and MDS and do agree with the plan. Patient's desire to return to the community is not assessible due to cognitive impairment. Information reviewed:  Medications, vital signs, orders, and nursing notes.    ROS:  Limited secondary to cognitive impairment but today pt reports \"pushing the check of 100\"    Vitals:  /65   Pulse 66   Temp 97.9  F (36.6  C)   Resp 16   Ht 1.524 m (5')   Wt 73.7 kg (162 lb 6.4 oz)   SpO2 95%   BMI 31.72 kg/m     Body mass index is 31.72 kg/m .  Exam:  GENERAL " APPEARANCE:  up in w/c, in no distress  ENT:   moist mucous membranes, poor dentition, missing multiple teeth  Eyes: PERRL, EOM intact,ectripion left lower lid, no purulent drainage, sclera white  RESP:  respiratory effort and palpation of chest normal, lungs clear to auscultation with fair air movement, no respiratory distress  CV:  Palpation and auscultation of heart done , regular rate and rhythm,   2/6 murmur, no rub, or gallop, peripheral edema 2+ in BLLE, particularly to ankles and feet---baseline  ABDOMEN:  normal bowel sounds, soft, nontender, no hepatosplenomegaly or other masses, obese  M/S:   Gait and station abnormal primarily w/c bound, CMS intact, no erythema/edema of joints, deforming changes of OA in both hands  Skin: warm and very dry   PSYCH:  oriented to person, place, insight and judgement impaired, memory impaired, talkative, slightly irritable today          Lab/Diagnostic data:   Recent labs in [facility] reviewed by me today.     ASSESSMENT/PLAN  (G30.1,  F02.81) Late onset Alzheimer's disease with behavioral disturbance  (primary encounter diagnosis)  Comment: Chronic and progressive with behaviors during cares  Plan: appropriate for LTC, staff assist with cares, redirect behaviors, staff to update with concerns  -monitor weights    (E03.9) Acquired hypothyroidism  Comment: per history, on synthroid  Plan: synthroid  -annual TSH    (R60.0) Localized edema  Comment: chronic and unchanged, non-compliant with elevation, refusing compression  Plan: monitor skin integrity    (I10) Benign essential hypertension  Comment: chronic, not currently medicated, goal SBP <150 given age and goals of care, control adequate  Plan: continue to monitor  -periodic BMP    Ectropion  Comment: chronic, not bothersome, has refused eye drops in past  Plan: monitor for bothersome symptoms or s/s infection      Electronically signed by:  JOSE Ennis CNP      Sincerely,        JOSE Ennis  CNP

## 2019-04-04 NOTE — PROGRESS NOTES
[place] [facility]  Chief Complaint   Patient presents with     retirement Regulatory     [place] Medical Record Number:  [id number]  Place of Service where encounter took place:  [facility] ([facility] [[id number]]      HPI:    Alondra Thompson  is 89 year old (2/27/1919), who is being seen today for a federally mandated E/M visit.  HPI information obtained from: facility chart records, facility staff, patient report and [facility] chart review. Today's concerns are:  Late onset Alzheimer's disease with behavioral disturbance  Chronic and progressive.  Behaviors with cares at times, per NH staff, can refuse to take bathes, does not like assistance in bathroom.  Review of records in NH, last recording of behaviors from end of March.  Behaviors reported are infrequent and generally with cares.  Her behaviors with staff often include yelling and cursing and occasional combativeness, biting, generally redirectable.  Eating and sleeping well per staff.  No recent weight loss    Wt Readings from Last 4 Encounters:   04/04/19 73.7 kg (162 lb 6.4 oz)   02/07/19 74.4 kg (164 lb)   12/03/18 72.8 kg (160 lb 9.6 oz)   10/05/18 74.7 kg (164 lb 9.6 oz)       Acquired hypothyroidism  Per history, on synthroid    Lab Results   Component Value Date    TSH 2.87 12/10/2018         Localized edema  Edema to BLLE, refuses to wear compression, not compliant with elevating feet.  Currently without open areas or pain    Benign essential hypertension  Per history, not currently on antihypertensives.  Denies/no reports of chest pain, palpitations, dizziness or lightheadedness, h/a.  Review of [facility] BP, adequate control as noted below    BP: 112-134/65-69 mmHg  Lab Results   Component Value Date    CR 0.69 12/10/2018     Lab Results   Component Value Date    POTASSIUM 4.1 12/10/2018         Ectropion of left lower eyelid  Ongoing, notef to left eye, but no drainage, purulence noted, PERRL, EOM intact.   "Not bothersome to patient.  Have attempted interventions such as artificial tears in the past with poor effect and patient refused. Remains unchanged        ALLERGIES:Macrodantin [nitrofurantoin]  PAST MEDICAL HISTORY:   has a past medical history of Arthritis, Dementia, and Hypothyroidism.  PAST SURGICAL HISTORY:   has a past surgical history that includes orthopedic surgery; hernia repair; and Abdomen surgery.  FAMILY HISTORY: family history is not on file.  SOCIAL HISTORY:  reports that  has never smoked. She does not have any smokeless tobacco history on file. She reports that she drinks alcohol.    MEDICATIONS:  Current Outpatient Medications   Medication Sig Dispense Refill     ACETAMINOPHEN PO Take 500 mg by mouth 2 times daily AND BID PRN       ammonium lactate (AMLACTIN) 12 % cream Apply 1 g topically 2 times daily 385 g 11     levothyroxine (SYNTHROID) 112 MCG tablet Take 1 tablet (112 mcg) by mouth daily 90 tablet 11     nystatin (MYCOSTATIN) 501685 UNIT/GM POWD Apply 1 g topically 2 times daily as needed 60 g 11     polyethylene glycol (MIRALAX/GLYCOLAX) Packet Take 17 g by mouth three times a week On M/W/F       polyethylene glycol (MIRALAX/GLYCOLAX) Packet Take 17 g by mouth 2 times daily as needed for constipation 24 packet 11       Case Management:  I have reviewed the care plan and MDS and do agree with the plan. Patient's desire to return to the community is not assessible due to cognitive impairment. Information reviewed:  Medications, vital signs, orders, and nursing notes.    ROS:  Limited secondary to cognitive impairment but today pt reports \"pushing the check of 100\"    Vitals:  /65   Pulse 66   Temp 97.9  F (36.6  C)   Resp 16   Ht 1.524 m (5')   Wt 73.7 kg (162 lb 6.4 oz)   SpO2 95%   BMI 31.72 kg/m    Body mass index is 31.72 kg/m .  Exam:  GENERAL APPEARANCE:  up in w/c, in no distress  ENT:   moist mucous membranes, poor dentition, missing multiple teeth  Eyes: PERRL, EOM " intact,ectripion left lower lid, no purulent drainage, sclera white  RESP:  respiratory effort and palpation of chest normal, lungs clear to auscultation with fair air movement, no respiratory distress  CV:  Palpation and auscultation of heart done , regular rate and rhythm,   2/6 murmur, no rub, or gallop, peripheral edema 2+ in BLLE, particularly to ankles and feet---baseline  ABDOMEN:  normal bowel sounds, soft, nontender, no hepatosplenomegaly or other masses, obese  M/S:   Gait and station abnormal primarily w/c bound, CMS intact, no erythema/edema of joints, deforming changes of OA in both hands  Skin: warm and very dry   PSYCH:  oriented to person, place, insight and judgement impaired, memory impaired, talkative, slightly irritable today          Lab/Diagnostic data:   Recent labs in [facility] reviewed by me today.     ASSESSMENT/PLAN  (G30.1,  F02.81) Late onset Alzheimer's disease with behavioral disturbance  (primary encounter diagnosis)  Comment: Chronic and progressive with behaviors during cares  Plan: appropriate for LTC, staff assist with cares, redirect behaviors, staff to update with concerns  -monitor weights    (E03.9) Acquired hypothyroidism  Comment: per history, on synthroid  Plan: synthroid  -annual TSH    (R60.0) Localized edema  Comment: chronic and unchanged, non-compliant with elevation, refusing compression  Plan: monitor skin integrity    (I10) Benign essential hypertension  Comment: chronic, not currently medicated, goal SBP <150 given age and goals of care, control adequate  Plan: continue to monitor  -periodic BMP    Ectropion  Comment: chronic, not bothersome, has refused eye drops in past  Plan: monitor for bothersome symptoms or s/s infection      Electronically signed by:  JOSE Ennis CNP

## 2019-06-27 NOTE — LETTER
"    6/27/2019        RE: Alondra Thompson  [facility]  [address]  [address]  [facility] [address]        [place] [facility]  PHYSICIAN NOTE    Chief Complaint   Patient presents with     [facility] Regulatory       HPI:    Alondra Thompson is a 89 year old  (2/27/1919), who is being seen today for a federally mandated E/M visit at [facility] . Admitted to LT in March 2014. She is seen today in common room and is quite cheerful and talkative. Denies aches/pains. 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 She is not bothered by her left eye blepharitis and in fact has no concerns for me today. I did speak with bedside RN who says she is great in setting of common room, however, at times she can be hard to redirect with direct cares in her room. Discussed how in the setting of dementia, she may not see reasoning behind those cares and/or be fearful. Not on medications for behaviors as is generally redirectable. Per Dr. Vila's prior note, \"Patient's sister Hansa passed away, and now patient has a legal guardian\".     ALLERGIES: Macrodantin [nitrofurantoin]    Past Medical History:   Diagnosis Date     Arthritis      Dementia      Hypothyroidism       Past Surgical History:   Procedure Laterality Date     GENITOURINARY SURGERY       HERNIA REPAIR       ORTHOPEDIC SURGERY        CODE STATUS: DNR/I    MEDICATIONS:  Current Outpatient Medications   Medication Sig Dispense Refill     ACETAMINOPHEN PO Take 500 mg by mouth 2 times daily AND BID PRN       ammonium lactate (LAC-HYDRIN) 12 % external lotion Apply topically 3 times daily as needed for dry skin       levothyroxine (SYNTHROID) 112 MCG tablet Take 1 tablet (112 mcg) by mouth daily 90 tablet 11     nystatin (MYCOSTATIN) 407330 UNIT/GM POWD Apply 1 g topically 2 times daily as needed 60 g 11     polyethylene glycol (MIRALAX/GLYCOLAX) Packet Take 17 " "g by mouth three times a week On M/W/F       polyethylene glycol (MIRALAX/GLYCOLAX) Packet Take 17 g by mouth 2 times daily as needed for constipation 24 packet 11       ROS:  Limited secondary to cognitive impairment but today pt reports as above in HPI    Exam:  /76   Pulse 75   Temp 97.9  F (36.6  C)   Resp 15   Ht 1.524 m (5')   Wt 74.3 kg (163 lb 12.8 oz)   SpO2 95%   BMI 31.99 kg/m     Alert, pleasant, NAD, sitting up to wheelchair in common room  Minor left lower eyelid erythema/blepharitis  Repeats herself often but is excited about the stories she tells  Exclaims, \"What aches or pains?!?!\"  Old left wrist fracture noted with deformity  HRRR with 2/6 systolic murmur  Breathing non-labored, no cough    Lab/Diagnostic Data:    Normal BMP and TSH in Dec 2018    ASSESSMENT/PLAN:  Late onset Alzheimer's disease with behavioral disturbance  Behaviors only seem to be with direct cares that she likely doesn't understand; care team able to redirect  Not on medications for this indication  She is comfortable in common areas  Per Dr. Vila's note, \"Patient's sister Hasna passed away, and now patient has a legal guardian\"    Hypothyroidism, unspecified type  On Levothyroxine; annual TSH is enough if remains stable  Last TSH as above within normal limits in Dec 2018    Primary osteoarthritis involving multiple joints  Denies aches/pains today and looks very comfortable/happy  Is on low dose scheduled APAP with prn available    Blepharitis left eyelid  Not bothersome to her and per notes, prior attempts at treatment for it were more bothersome to her than the blepharitis itself      Total time spent with patient visit was >20 minutes including patient visit and review of past records as well as communicating with floor RN and primary NP. Greater than 50% of total time spent with counseling and coordinating care.    Electronically signed by:  Neelima Garner, DO        Sincerely,        Neelima Garner, " DO

## 2019-06-27 NOTE — PROGRESS NOTES
"[place] [facility]  PHYSICIAN NOTE    Chief Complaint   Patient presents with     [place]ail Regulatory       HPI:    Alondra Thompson is a 89 year old  (2/27/1919), who is being seen today for a federally mandated E/M visit at [facility] . Admitted to LT in March 2014. She is seen today in common room and is quite cheerful and talkative. Denies aches/pains. 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 She is not bothered by her left eye blepharitis and in fact has no concerns for me today. I did speak with bedside RN who says she is great in setting of common room, however, at times she can be hard to redirect with direct cares in her room. Discussed how in the setting of dementia, she may not see reasoning behind those cares and/or be fearful. Not on medications for behaviors as is generally redirectable. Per Dr. Vila's prior note, \"Patient's sister Hansa passed away, and now patient has a legal guardian\".     ALLERGIES: Macrodantin [nitrofurantoin]    Past Medical History:   Diagnosis Date     Arthritis      Dementia      Hypothyroidism       Past Surgical History:   Procedure Laterality Date     GENITOURINARY SURGERY       HERNIA REPAIR       ORTHOPEDIC SURGERY        CODE STATUS: DNR/I    MEDICATIONS:  Current Outpatient Medications   Medication Sig Dispense Refill     ACETAMINOPHEN PO Take 500 mg by mouth 2 times daily AND BID PRN       ammonium lactate (LAC-HYDRIN) 12 % external lotion Apply topically 3 times daily as needed for dry skin       levothyroxine (SYNTHROID) 112 MCG tablet Take 1 tablet (112 mcg) by mouth daily 90 tablet 11     nystatin (MYCOSTATIN) 934535 UNIT/GM POWD Apply 1 g topically 2 times daily as needed 60 g 11     polyethylene glycol (MIRALAX/GLYCOLAX) Packet Take 17 g by mouth three times a week On M/W/F       polyethylene glycol (MIRALAX/GLYCOLAX) Packet Take 17 g by mouth 2 times " "daily as needed for constipation 24 packet 11       ROS:  Limited secondary to cognitive impairment but today pt reports as above in HPI    Exam:  /76   Pulse 75   Temp 97.9  F (36.6  C)   Resp 15   Ht 1.524 m (5')   Wt 74.3 kg (163 lb 12.8 oz)   SpO2 95%   BMI 31.99 kg/m    Alert, pleasant, NAD, sitting up to wheelchair in common room  Minor left lower eyelid erythema/blepharitis  Repeats herself often but is excited about the stories she tells  Exclaims, \"What aches or pains?!?!\"  Old left wrist fracture noted with deformity  HRRR with 2/6 systolic murmur  Breathing non-labored, no cough    Lab/Diagnostic Data:    Normal BMP and TSH in Dec 2018    ASSESSMENT/PLAN:  Late onset Alzheimer's disease with behavioral disturbance  Behaviors only seem to be with direct cares that she likely doesn't understand; care team able to redirect  Not on medications for this indication  She is comfortable in common areas  Per Dr. Vila's note, \"Patient's sister Hansa passed away, and now patient has a legal guardian\"    Hypothyroidism, unspecified type  On Levothyroxine; annual TSH is enough if remains stable  Last TSH as above within normal limits in Dec 2018    Primary osteoarthritis involving multiple joints  Denies aches/pains today and looks very comfortable/happy  Is on low dose scheduled APAP with prn available    Blepharitis left eyelid  Not bothersome to her and per notes, prior attempts at treatment for it were more bothersome to her than the blepharitis itself      Total time spent with patient visit was >20 minutes including patient visit and review of past records as well as communicating with floor RN and primary NP. Greater than 50% of total time spent with counseling and coordinating care.    Electronically signed by:  Neelima Garner,     "

## 2019-07-19 NOTE — TELEPHONE ENCOUNTER
[place] [facility] TELEPHONE ENCOUNTER    Chief Complaint   Patient presents with     FEVER 100, UNWELL       Alondra Thompson is a 89 year old  (2/27/1919),Nurse called today to report: that Alondra had a sudden change in status.  She has advanced dementia and is unable to really state how she feels.  She is sleepy.  She was given Tylenol for the fever.  She is incontinent of B & B with resistive behaviors.  My first thought is UTI which have been the case in the past.  Nurse did not report any respiratory sx.    ASSESSMENT/PLAN  Fever/change in mental status    Ordered STAT CBC w/ diff, BMP, UA/UC - may straight cath    Once urine collected  Give Rocephin 1 gram with Lidocaine IM x 1    Prefers not to go to the hospital given her dementia with behaviors.    JOSE Diaz CNP

## 2019-07-20 NOTE — TELEPHONE ENCOUNTER
Nursing called to report a death but to ask if needing to report to the 's office.  Alondra had a fall two days ago without injury.  No falls in last 6 months with fracture.  Alondra took a turn for the worse yesterday and  Rocephin started.  This morning she had passed on.    This NP said No to the 's office and ok to release the body.        Electronically signed by Irais Melvin RN, CNP

## 2019-07-20 NOTE — TELEPHONE ENCOUNTER
Called in response to labs ordered earlier today for acute change in status and fever to 100.2 (see notes).    UA via straight cath:Turbid, Ketones negative, Blood small, Nitrite negative, LE large, Bacteria moderate, RBC 5-10, WBC 25-50, Squamous epi     UCx pending    BMP: Na 140, K 4.9, CO2 10, AG 25, Glucose 62, BUN 67, Creatinine 4.31, GFR 11    CBC: WBC 14.2, Hgb 15.4, Platelet 168    Status update: sleeping and resistive to cares, did not eat lunch or dinner    Vitals at 8pm: /79, SpO2 94% RA, HR 86, RR 22, T 99.0. She has had Tylenol. Rocephin was administered at 10 PM tonight.      A/P: Gave orders for vitals q4 and more frequent check-ins on her throughout the night. PUSH fluids as tolerated. Keflex 250 mg every 24 hours starting tomorrow evening x 7 days. Recheck BMP and CBC on Sunday (as labs were just drawn late in the day today). POLST: DNR/I with comfort focus; RN to call Alondra's guardian re: significant infection which could be life threatening. UCx is in process.          Neelima Garner,

## 2019-07-21 ENCOUNTER — TELEPHONE (OUTPATIENT)
Dept: GERIATRICS | Facility: CLINIC | Age: 84
End: 2019-07-21

## 2019-07-22 ENCOUNTER — TELEPHONE (OUTPATIENT)
Dept: GERIATRICS | Facility: CLINIC | Age: 84
End: 2019-07-22

## 2019-07-22 NOTE — TELEPHONE ENCOUNTER
I called and spoke with RN Marla arias: Alondra's passing over the weekend. She mentioned Alondra's rapid decline and said she  peacefully in her room.    Neelima Garner, DO

## 2019-07-22 NOTE — TELEPHONE ENCOUNTER
Received VM re: patient had a recent fall, no apparent injury but now seems to be not herself no specific symptoms. Forwarding update to primary NP. Call back number is [phone]    Electronically signed by JOSE Crystal GNP

## 2020-06-15 NOTE — PROGRESS NOTES
"Alondra Thompson is a 89 year old female seen February 8, 2018 at [facility] where she has resided for 4 years (admit 3/2014) seen to follow up dementia, OA.     Patient is seen on the unit, up to WC.   She is pleasant, talking about her family.   States she has a twin sister, and 2 other sisters \"adopted from the Welfare\"   Seems to think they are still alive, but in truth Alondra is the only one in her family still alive.       She has progressive dementia, low functional status, however does not like assistance with her personal cares.   She can be resistant, yelling and cursing, occ combative with bathing, hygiene.    Patient has OA / DJD with knee pain as primary symptoms  No pain when sitting in her WC, which she uses for all destinations.    Has h/o hypothyroidism and is on replacement    Missing most of her teeth but still eats well and weight is stable.     Past Medical History:   Diagnosis Date     Arthritis      Dementia      Hypothyroidism      SH:    Single, previously lived with her sister, house in [place].   Retired from work for the [employer]        ROS:  Limited, but negative other than above.   Wt Readings from Last 5 Encounters:   02/07/18 163 lb 3.2 oz (74 kg)   01/04/18 161 lb 4.8 oz (73.2 kg)   12/14/17 160 lb 4.8 oz (72.7 kg)   10/11/17 160 lb 4.8 oz (72.7 kg)   08/09/17 160 lb 4.8 oz (72.7 kg)        EXAM:  NAD  /68  Pulse 69  Temp 97.8  F (36.6  C)  Resp 16  Ht 5' (1.524 m)  Wt 163 lb 3.2 oz (74 kg)  SpO2 97%  BMI 31.87 kg/m2   Missing all her upper teeth.  Neck supple without adenopathy  Lung clear bilaterally with fair air movement  Heart RRR s1s2, 1/6 JULI  Abd obese, soft, NT, no distention, +BS  Ext without edema     Deforming changes of OA in both hands.     Psych: affect okay, smiling  Neuro: no focal deficits, confused.     TSH   Date Value Ref Range Status   07/13/2017 4.74 0.30 - 5.00 uIU/mL Final   Last Basic Metabolic Panel:  Lab Results " 6/16/20 Patient: Ashley Thakur YOB: 1953 Date of Visit: 6/15/2020 Karen Caruso MD 
[id number] [id number] VIA Facsimile: [phone] Dear Karen Caruso MD, Thank you for referring Ms. Brad Rolon to [facility] for evaluation. My notes for this consultation are attached. If you have questions, please do not hesitate to call me. I look forward to following your patient along with you. Sincerely, Oriana Remy MD 
 
   Component Value Date     12/18/2017      Lab Results   Component Value Date    POTASSIUM 4.4 12/18/2017     Lab Results   Component Value Date    CHLORIDE 110 12/18/2017     Lab Results   Component Value Date    FABIO 9.0 12/18/2017     Lab Results   Component Value Date    CO2 26 12/18/2017     Lab Results   Component Value Date    BUN 16 12/18/2017     Lab Results   Component Value Date    CR 0.73 12/18/2017   GFR >60    Lab Results   Component Value Date    GLC 74 12/18/2017         IMP/PLAN:  (M15.0) Primary osteoarthritis involving multiple joints    Comment: with deformities, some pain and decreased mobility     Plan: scheduled and prn acetaminophen, local measures.         (E03.9) Acquired hypothyroidism  Comment: on replacement  Plan: yearly TSH, goal 4-6       (G30.1,  F02.81) Late onset Alzheimer's disease with behavioral disturbance  Comment: gradual decline, low functional status   Plan: LTC support for assist with meds, meals, activity, mobility and safety.     Staff has been able to use non-pharmacologic approaches for her behaviors, to date.   Will follow, may need to add med prior to cares for basic hygiene.        Adrianne Vila MD

## 2022-10-04 PROBLEM — G30.9 ALZHEIMER'S DEMENTIA WITH BEHAVIORAL DISTURBANCE (H): Status: RESOLVED | Noted: 2017-12-14 | Resolved: 2018-01-01

## 2022-10-04 PROBLEM — F02.818 ALZHEIMER'S DEMENTIA WITH BEHAVIORAL DISTURBANCE (H): Status: RESOLVED | Noted: 2017-12-14 | Resolved: 2018-01-01
